# Patient Record
Sex: MALE | Race: OTHER | HISPANIC OR LATINO | Employment: FULL TIME | URBAN - METROPOLITAN AREA
[De-identification: names, ages, dates, MRNs, and addresses within clinical notes are randomized per-mention and may not be internally consistent; named-entity substitution may affect disease eponyms.]

---

## 2017-06-22 ENCOUNTER — HOSPITAL ENCOUNTER (EMERGENCY)
Facility: HOSPITAL | Age: 42
Discharge: HOME | End: 2017-06-22
Attending: EMERGENCY MEDICINE | Admitting: EMERGENCY MEDICINE

## 2017-06-22 VITALS
SYSTOLIC BLOOD PRESSURE: 159 MMHG | HEART RATE: 76 BPM | WEIGHT: 207.45 LBS | TEMPERATURE: 97.5 F | RESPIRATION RATE: 18 BRPM | OXYGEN SATURATION: 95 % | DIASTOLIC BLOOD PRESSURE: 102 MMHG

## 2017-06-22 DIAGNOSIS — S05.02XA LEFT CORNEAL ABRASION, INITIAL ENCOUNTER: Primary | ICD-10-CM

## 2017-06-22 PROCEDURE — 90471 IMMUNIZATION ADMIN: CPT

## 2017-06-22 PROCEDURE — 90715 TDAP VACCINE 7 YRS/> IM: CPT | Performed by: EMERGENCY MEDICINE

## 2017-06-22 PROCEDURE — 99283 EMERGENCY DEPT VISIT LOW MDM: CPT

## 2017-06-22 RX ORDER — TOBRAMYCIN 3 MG/ML
1 SOLUTION/ DROPS OPHTHALMIC ONCE
Status: COMPLETED | OUTPATIENT
Start: 2017-06-22 | End: 2017-06-22

## 2017-06-22 RX ORDER — KETOROLAC TROMETHAMINE 5 MG/ML
1 SOLUTION OPHTHALMIC ONCE
Status: COMPLETED | OUTPATIENT
Start: 2017-06-22 | End: 2017-06-22

## 2017-06-22 RX ORDER — PROPARACAINE HYDROCHLORIDE 5 MG/ML
2 SOLUTION/ DROPS OPHTHALMIC ONCE
Status: COMPLETED | OUTPATIENT
Start: 2017-06-22 | End: 2017-06-22

## 2017-06-22 RX ADMIN — KETOROLAC TROMETHAMINE 1 DROP: 5 SOLUTION OPHTHALMIC at 06:50

## 2017-06-22 RX ADMIN — TETANUS TOXOID, REDUCED DIPHTHERIA TOXOID AND ACELLULAR PERTUSSIS VACCINE, ADSORBED 0.5 ML: 5; 2.5; 8; 8; 2.5 SUSPENSION INTRAMUSCULAR at 06:53

## 2017-06-22 RX ADMIN — TOBRAMYCIN 1 DROP: 3 SOLUTION OPHTHALMIC at 06:52

## 2017-06-22 RX ADMIN — PROPARACAINE HYDROCHLORIDE 2 DROP: 5 SOLUTION/ DROPS OPHTHALMIC at 06:14

## 2017-06-22 RX ADMIN — FLUORESCEIN SODIUM 1 STRIP: 1 STRIP OPHTHALMIC at 06:14

## 2022-11-14 ENCOUNTER — APPOINTMENT (EMERGENCY)
Dept: CT IMAGING | Facility: HOSPITAL | Age: 47
End: 2022-11-14

## 2022-11-14 ENCOUNTER — APPOINTMENT (EMERGENCY)
Dept: RADIOLOGY | Facility: HOSPITAL | Age: 47
End: 2022-11-14

## 2022-11-14 ENCOUNTER — HOSPITAL ENCOUNTER (INPATIENT)
Facility: HOSPITAL | Age: 47
LOS: 1 days | Discharge: HOME | End: 2022-11-16
Attending: EMERGENCY MEDICINE | Admitting: STUDENT IN AN ORGANIZED HEALTH CARE EDUCATION/TRAINING PROGRAM

## 2022-11-14 ENCOUNTER — APPOINTMENT (OUTPATIENT)
Dept: CT IMAGING | Facility: HOSPITAL | Age: 47
End: 2022-11-14

## 2022-11-14 DIAGNOSIS — R07.9 CHEST PAIN, UNSPECIFIED TYPE: ICD-10-CM

## 2022-11-14 DIAGNOSIS — T78.40XA ALLERGIC REACTION, INITIAL ENCOUNTER: ICD-10-CM

## 2022-11-14 DIAGNOSIS — R13.10 ODYNOPHAGIA: ICD-10-CM

## 2022-11-14 DIAGNOSIS — R73.9 HYPERGLYCEMIA: ICD-10-CM

## 2022-11-14 DIAGNOSIS — R13.10 DYSPHAGIA, UNSPECIFIED TYPE: ICD-10-CM

## 2022-11-14 DIAGNOSIS — T18.9XXA FOREIGN BODY ALIMENTARY TRACT: ICD-10-CM

## 2022-11-14 DIAGNOSIS — R00.0 SINUS TACHYCARDIA: ICD-10-CM

## 2022-11-14 DIAGNOSIS — R07.9 CHEST PAIN IN ADULT: Primary | ICD-10-CM

## 2022-11-14 LAB
2HR DELTA HS TROPONIN: 15 NG/L
4HR DELTA HS TROPONIN: 25 NG/L
ALBUMIN SERPL BCP-MCNC: 3.8 G/DL (ref 3.5–5)
ALP SERPL-CCNC: 84 U/L (ref 34–104)
ALT SERPL W P-5'-P-CCNC: 28 U/L (ref 7–52)
AMPHETAMINES SERPL QL SCN: NEGATIVE
ANION GAP SERPL CALCULATED.3IONS-SCNC: 12 MMOL/L (ref 4–13)
AST SERPL W P-5'-P-CCNC: 18 U/L (ref 13–39)
BARBITURATES UR QL: NEGATIVE
BASOPHILS # BLD AUTO: 0.01 THOUSANDS/ÂΜL (ref 0–0.1)
BASOPHILS NFR BLD AUTO: 0 % (ref 0–1)
BENZODIAZ UR QL: NEGATIVE
BILIRUB SERPL-MCNC: 0.53 MG/DL (ref 0.2–1)
BNP SERPL-MCNC: 6 PG/ML (ref 0–100)
BUN SERPL-MCNC: 16 MG/DL (ref 5–25)
CALCIUM SERPL-MCNC: 9.4 MG/DL (ref 8.4–10.2)
CARDIAC TROPONIN I PNL SERPL HS: 24 NG/L
CARDIAC TROPONIN I PNL SERPL HS: 34 NG/L
CARDIAC TROPONIN I PNL SERPL HS: 9 NG/L
CHLORIDE SERPL-SCNC: 101 MMOL/L (ref 96–108)
CHOLEST SERPL-MCNC: 130 MG/DL
CO2 SERPL-SCNC: 23 MMOL/L (ref 21–32)
COCAINE UR QL: POSITIVE
CREAT SERPL-MCNC: 0.94 MG/DL (ref 0.6–1.3)
D DIMER PPP FEU-MCNC: 2.35 UG/ML FEU
EOSINOPHIL # BLD AUTO: 0.12 THOUSAND/ÂΜL (ref 0–0.61)
EOSINOPHIL NFR BLD AUTO: 2 % (ref 0–6)
ERYTHROCYTE [DISTWIDTH] IN BLOOD BY AUTOMATED COUNT: 13.4 % (ref 11.6–15.1)
EST. AVERAGE GLUCOSE BLD GHB EST-MCNC: 126 MG/DL
ETHANOL SERPL-MCNC: <10 MG/DL
GFR SERPL CREATININE-BSD FRML MDRD: 97 ML/MIN/1.73SQ M
GLUCOSE SERPL-MCNC: 230 MG/DL (ref 65–140)
HBA1C MFR BLD: 6 %
HCT VFR BLD AUTO: 41.9 % (ref 36.5–49.3)
HDLC SERPL-MCNC: 33 MG/DL
HGB BLD-MCNC: 14.4 G/DL (ref 12–17)
IMM GRANULOCYTES # BLD AUTO: 0.08 THOUSAND/UL (ref 0–0.2)
IMM GRANULOCYTES NFR BLD AUTO: 1 % (ref 0–2)
LDLC SERPL CALC-MCNC: 68 MG/DL (ref 0–100)
LIPASE SERPL-CCNC: 33 U/L (ref 11–82)
LYMPHOCYTES # BLD AUTO: 2.48 THOUSANDS/ÂΜL (ref 0.6–4.47)
LYMPHOCYTES NFR BLD AUTO: 33 % (ref 14–44)
MAGNESIUM SERPL-MCNC: 1.9 MG/DL (ref 1.9–2.7)
MCH RBC QN AUTO: 29.4 PG (ref 26.8–34.3)
MCHC RBC AUTO-ENTMCNC: 34.4 G/DL (ref 31.4–37.4)
MCV RBC AUTO: 86 FL (ref 82–98)
METHADONE UR QL: NEGATIVE
MONOCYTES # BLD AUTO: 0.15 THOUSAND/ÂΜL (ref 0.17–1.22)
MONOCYTES NFR BLD AUTO: 2 % (ref 4–12)
NEUTROPHILS # BLD AUTO: 4.7 THOUSANDS/ÂΜL (ref 1.85–7.62)
NEUTS SEG NFR BLD AUTO: 62 % (ref 43–75)
NONHDLC SERPL-MCNC: 97 MG/DL
NRBC BLD AUTO-RTO: 0 /100 WBCS
OPIATES UR QL SCN: NEGATIVE
OXYCODONE+OXYMORPHONE UR QL SCN: NEGATIVE
PCP UR QL: NEGATIVE
PLATELET # BLD AUTO: 341 THOUSANDS/UL (ref 149–390)
PMV BLD AUTO: 9.4 FL (ref 8.9–12.7)
POTASSIUM SERPL-SCNC: 3.6 MMOL/L (ref 3.5–5.3)
PROT SERPL-MCNC: 6.9 G/DL (ref 6.4–8.4)
RBC # BLD AUTO: 4.9 MILLION/UL (ref 3.88–5.62)
SODIUM SERPL-SCNC: 136 MMOL/L (ref 135–147)
T4 FREE SERPL-MCNC: 1.08 NG/DL (ref 0.76–1.46)
THC UR QL: NEGATIVE
TRIGL SERPL-MCNC: 146 MG/DL
TSH SERPL DL<=0.05 MIU/L-ACNC: 6.71 UIU/ML (ref 0.45–4.5)
WBC # BLD AUTO: 7.54 THOUSAND/UL (ref 4.31–10.16)

## 2022-11-14 RX ORDER — ASPIRIN 81 MG/1
162 TABLET, CHEWABLE ORAL ONCE
Status: COMPLETED | OUTPATIENT
Start: 2022-11-14 | End: 2022-11-14

## 2022-11-14 RX ORDER — SODIUM CHLORIDE, SODIUM LACTATE, POTASSIUM CHLORIDE, CALCIUM CHLORIDE 600; 310; 30; 20 MG/100ML; MG/100ML; MG/100ML; MG/100ML
100 INJECTION, SOLUTION INTRAVENOUS CONTINUOUS
Status: DISCONTINUED | OUTPATIENT
Start: 2022-11-14 | End: 2022-11-15

## 2022-11-14 RX ORDER — MAGNESIUM HYDROXIDE/ALUMINUM HYDROXICE/SIMETHICONE 120; 1200; 1200 MG/30ML; MG/30ML; MG/30ML
30 SUSPENSION ORAL ONCE
Status: COMPLETED | OUTPATIENT
Start: 2022-11-14 | End: 2022-11-14

## 2022-11-14 RX ORDER — MAGNESIUM HYDROXIDE/ALUMINUM HYDROXICE/SIMETHICONE 120; 1200; 1200 MG/30ML; MG/30ML; MG/30ML
30 SUSPENSION ORAL EVERY 4 HOURS PRN
Status: DISCONTINUED | OUTPATIENT
Start: 2022-11-14 | End: 2022-11-16 | Stop reason: HOSPADM

## 2022-11-14 RX ORDER — FAMOTIDINE 20 MG/1
20 TABLET, FILM COATED ORAL 2 TIMES DAILY
Status: DISCONTINUED | OUTPATIENT
Start: 2022-11-14 | End: 2022-11-14

## 2022-11-14 RX ORDER — LORAZEPAM 2 MG/ML
1 INJECTION INTRAMUSCULAR ONCE
Status: COMPLETED | OUTPATIENT
Start: 2022-11-14 | End: 2022-11-14

## 2022-11-14 RX ORDER — NICOTINE 21 MG/24HR
1 PATCH, TRANSDERMAL 24 HOURS TRANSDERMAL DAILY
Status: DISCONTINUED | OUTPATIENT
Start: 2022-11-14 | End: 2022-11-16 | Stop reason: HOSPADM

## 2022-11-14 RX ORDER — METHYLPREDNISOLONE SOD SUCC 125 MG
1 VIAL (EA) INJECTION ONCE
Status: COMPLETED | OUTPATIENT
Start: 2022-11-14 | End: 2022-11-14

## 2022-11-14 RX ORDER — EPINEPHRINE 1 MG/ML
1 INJECTION, SOLUTION, CONCENTRATE INTRAVENOUS ONCE
Status: COMPLETED | OUTPATIENT
Start: 2022-11-14 | End: 2022-11-14

## 2022-11-14 RX ORDER — ACETAMINOPHEN 325 MG/1
650 TABLET ORAL EVERY 6 HOURS PRN
Status: DISCONTINUED | OUTPATIENT
Start: 2022-11-14 | End: 2022-11-16 | Stop reason: HOSPADM

## 2022-11-14 RX ORDER — PANTOPRAZOLE SODIUM 40 MG/10ML
40 INJECTION, POWDER, LYOPHILIZED, FOR SOLUTION INTRAVENOUS
Status: DISCONTINUED | OUTPATIENT
Start: 2022-11-14 | End: 2022-11-16 | Stop reason: HOSPADM

## 2022-11-14 RX ADMIN — ALUMINUM HYDROXIDE, MAGNESIUM HYDROXIDE, AND DIMETHICONE 30 ML: 200; 20; 200 SUSPENSION ORAL at 07:24

## 2022-11-14 RX ADMIN — FAMOTIDINE 20 MG: 20 TABLET ORAL at 17:26

## 2022-11-14 RX ADMIN — SODIUM CHLORIDE, SODIUM LACTATE, POTASSIUM CHLORIDE, AND CALCIUM CHLORIDE 100 ML/HR: .6; .31; .03; .02 INJECTION, SOLUTION INTRAVENOUS at 07:23

## 2022-11-14 RX ADMIN — ASPIRIN 162 MG: 81 TABLET, CHEWABLE ORAL at 05:53

## 2022-11-14 RX ADMIN — IOHEXOL 100 ML: 350 INJECTION, SOLUTION INTRAVENOUS at 05:11

## 2022-11-14 RX ADMIN — FAMOTIDINE 20 MG: 20 TABLET ORAL at 10:29

## 2022-11-14 RX ADMIN — SODIUM CHLORIDE 1000 ML: 0.9 INJECTION, SOLUTION INTRAVENOUS at 02:52

## 2022-11-14 RX ADMIN — LORAZEPAM 1 MG: 2 INJECTION INTRAMUSCULAR; INTRAVENOUS at 03:01

## 2022-11-14 RX ADMIN — PANTOPRAZOLE SODIUM 40 MG: 40 INJECTION, POWDER, FOR SOLUTION INTRAVENOUS at 17:26

## 2022-11-14 NOTE — ASSESSMENT & PLAN NOTE
Presents with complaints of substernal chest pain, epigastric discomfort and SOB after eating at [facility] prior to falling asleep  SOB resolved and reports faint chest pain currently  · Likely noncardiac, give GI cocktail   Consider RUQ to evalute GB if symptoms persist   · No prior cardiac hx  · JORGE 0  · CXR: NAD  · CTA negative for PE   · UDS pending  · Trend hs trop and ECG  · Hs trop: 9>24  · ECG: sinus tachycardia 114, nonspecific ST-T wave abnormality   · Monitor on tele   · Check A1c and lipid panel

## 2022-11-14 NOTE — H&P
[address] [place] [address] 1975, 41 y o  male MRN: [id number]  Unit/Bed#: -01 Encounter: [id number]  Primary Care Provider: No primary care provider on file  Date and time admitted to hospital: 11/14/2022  2:29 AM    * Chest pain  Assessment & Plan  Presents with complaints of substernal chest pain, epigastric discomfort and SOB after eating at [facility] prior to falling asleep  SOB resolved and reports faint chest pain currently  · Likely noncardiac, give GI cocktail  Consider RUQ to evalute GB if symptoms persist   · No prior cardiac hx  · JORGE 0  · CXR: NAD  · CTA negative for PE   · UDS pending  · Trend hs trop and ECG  · Hs trop: 9>24  · ECG: sinus tachycardia 114, nonspecific ST-T wave abnormality   · Monitor on tele   · Check A1c and lipid panel       Allergic reaction  Assessment & Plan  · Reports pruritic rash  No angioedema, no difficulty breathing  SOB resolved  · Faint maculopapular rash on trunk   · S/p Benadryl, Epi and Solu-medrol in EMS  · Supportive care     Sinus tachycardia  Assessment & Plan  · Possibly related to epinephrine use  · CTA negative for PE   · Check TSH   · Monitor on tele   · Improved with bolus, continue IVF    VTE Pharmacologic Prophylaxis: VTE Score: 2 Low Risk (Score 0-2) - Encourage Ambulation  Code Status: Level 1 - Full Code   Discussion with family: Patient declined call to   Anticipated Length of Stay: Patient will be admitted on an observation basis with an anticipated length of stay of less than 2 midnights secondary to chest pain r/o  Total Time for Visit, including Counseling / Coordination of Care: 45 minutes Greater than 50% of this total time spent on direct patient counseling and coordination of care      Chief Complaint: chest pain    History of Present Illness:  Cody Pritchett is a 41 y o  male  who presents with concerns of chest pain, shortness of breath and possible allergic reaction starting this evening  Reports substernal chest pain and epigastric pain starting tonight after eating [employer] prior to falling asleep  Noted some shortness of breath, now resolved  No association with positions, respirations  He denies any prior cardiac history or cardiac testing  Does not any medical problems to his knowledge  Does not follow with a primary care doctor  He reports an itchy rash on his trunk  On evaluation, faint maculopapular rash visualized  Was placed on 2 L nasal cannula in ER for comfort, not noted to desaturate less than 90%  Patient Nigerian-speaking and video interpretation was used  During history, patient became very agitated and would refuse to answer questions  Attempted to explain to patient trying to help him to figure out what is going on  Continue to use foul language and not answer when  ask questions  Review of Systems:  Review of Systems   Constitutional: Negative for chills and fever  Respiratory: Positive for shortness of breath  Cardiovascular: Positive for chest pain  Negative for palpitations and leg swelling  Gastrointestinal: Positive for abdominal pain  Negative for diarrhea, nausea and vomiting  Endocrine: Negative for polyuria  Genitourinary: Negative for difficulty urinating, frequency and urgency  Musculoskeletal: Negative for gait problem  Skin: Positive for rash  Neurological: Negative for dizziness, syncope and weakness  Psychiatric/Behavioral: Negative for sleep disturbance  Past Medical and Surgical History:   History reviewed  No pertinent past medical history  History reviewed  No pertinent surgical history  Meds/Allergies:  Prior to Admission medications    Not on File     I have reviewed home medications with patient personally  Allergies:    Allergies   Allergen Reactions   • Shellfish-Derived Products - Food Allergy Rash       Social History:  Marital Status: Single   Occupation:  Patient Pre-hospital Living Situation: Home  Patient Pre-hospital Level of Mobility: walks  Patient Pre-hospital Diet Restrictions:   Substance Use History:   Social History     Substance and Sexual Activity   Alcohol Use Not Currently    Comment: socially     Social History     Tobacco Use   Smoking Status Current Every Day Smoker   • Packs/day: 1 00   • Years: 23 00   • Pack years: 23 00   • Types: Cigarettes   Smokeless Tobacco Never Used     Social History     Substance and Sexual Activity   Drug Use No       Family History:  History reviewed  No pertinent family history  Physical Exam:     Vitals:   Blood Pressure: 113/73 (11/14/22 0552)  Pulse: 96 (11/14/22 0552)  Temperature: 97 7 °F (36 5 °C) (11/14/22 0242)  Temp Source: Oral (11/14/22 0242)  Respirations: 16 (11/14/22 0552)  Weight - Scale: 93 9 kg (207 lb) (11/14/22 0232)  SpO2: 98 % (11/14/22 0552)    Physical Exam  Vitals and nursing note reviewed  Constitutional:       General: He is not in acute distress  Appearance: He is not toxic-appearing  Comments: Patient is resting comfortably during exam  Not in acute distress   HENT:      Head: Normocephalic and atraumatic  Mouth/Throat:      Mouth: Mucous membranes are moist    Eyes:      Extraocular Movements: Extraocular movements intact  Pupils: Pupils are equal, round, and reactive to light  Cardiovascular:      Rate and Rhythm: Normal rate and regular rhythm  Pulses: Normal pulses  Heart sounds: Normal heart sounds  No murmur heard  Pulmonary:      Effort: Pulmonary effort is normal  No respiratory distress  Breath sounds: Normal breath sounds  No wheezing or rales  Abdominal:      General: Abdomen is flat  Bowel sounds are normal  There is no distension  Palpations: Abdomen is soft  Tenderness: There is abdominal tenderness  There is no guarding        Comments: Tenderness evaluation epigastric and right upper quadrant  Faint maculopapular rash visualized on trunk Musculoskeletal:      Right lower leg: No edema  Left lower leg: No edema  Skin:     General: Skin is warm and dry  Capillary Refill: Capillary refill takes less than 2 seconds  Neurological:      General: No focal deficit present  Mental Status: He is alert and oriented to person, place, and time  Cranial Nerves: No cranial nerve deficit  Psychiatric:         Mood and Affect: Mood normal  Affect is angry  Behavior: Behavior is uncooperative and agitated  Additional Data:   Lab Results:  Results from last 7 days   Lab Units 11/14/22  0254   WBC Thousand/uL 7 54   HEMOGLOBIN g/dL 14 4   HEMATOCRIT % 41 9   PLATELETS Thousands/uL 341   NEUTROS PCT % 62   LYMPHS PCT % 33   MONOS PCT % 2*   EOS PCT % 2     Results from last 7 days   Lab Units 11/14/22  0254   SODIUM mmol/L 136   POTASSIUM mmol/L 3 6   CHLORIDE mmol/L 101   CO2 mmol/L 23   BUN mg/dL 16   CREATININE mg/dL 0 94   ANION GAP mmol/L 12   CALCIUM mg/dL 9 4   ALBUMIN g/dL 3 8   TOTAL BILIRUBIN mg/dL 0 53   ALK PHOS U/L 84   ALT U/L 28   AST U/L 18   GLUCOSE RANDOM mg/dL 230*                       Imaging: Reviewed radiology reports from this admission including: chest xray and chest CT scan  CTA ED chest PE Study   Final Result by Antonio Robert MD (11/14 0517)      No acute pathology  No pulmonary embolus  Workstation performed: [id number]         XR chest 1 view portable   ED Interpretation by Leo Daniel MD (11/14 0315)   No infiltrate or pneumothorax      Final Result by Antonio Robert MD (11/14 0517)      No acute cardiopulmonary disease  Workstation performed: [id number]             EKG and Other Studies Reviewed on Admission:   · EKG: Sinus Tachycardia       ** Please Note: This note has been constructed using a voice recognition system   **

## 2022-11-14 NOTE — ASSESSMENT & PLAN NOTE
· Possibly related to epinephrine use  · CTA negative for PE   · Check TSH   · Monitor on tele   · Improved with bolus, continue IVF

## 2022-11-14 NOTE — ED PROVIDER NOTES
History  Chief Complaint   Patient presents with   • Allergic Reaction     Patient arrives via EMS with reports of allergic reaction after eating at [facility] with rash, difficulty breathing, and chest pain  EMS gave pt epi, solumedrol, and benadryl PTA     Patient is a 22-year-old male seen in the emergency department with concern for substernal strong chest pain which began this evening prior to evaluation  Patient states that he ate food from [facility] this evening and developed chest pain prior to evaluation  Patient also notes some scattered rash over his body  Patient was treated with epinephrine, Solu-Medrol, and Benadryl prior to evaluation for symptom control  Patient notes history of allergy to seafood  Patient also notes some mild shortness of breath  Patient notes no abdominal pain, nausea, vomiting, weakness  Patient notes no definite clear exacerbating or alleviating factors for the pain  Patient denies having similar symptoms in the past   Patient denies alcohol and other drug use this evening  None       History reviewed  No pertinent past medical history  History reviewed  No pertinent surgical history  History reviewed  No pertinent family history  I have reviewed and agree with the history as documented  E-Cigarette/Vaping   • E-Cigarette Use Never User      E-Cigarette/Vaping Substances     Social History     Tobacco Use   • Smoking status: Current Every Day Smoker     Packs/day: 1 00     Years: 23 00     Pack years: 23 00     Types: Cigarettes   • Smokeless tobacco: Never Used   Vaping Use   • Vaping Use: Never used   Substance Use Topics   • Alcohol use: Not Currently     Comment: socially   • Drug use: No       Review of Systems   Constitutional: Negative for chills and fever  HENT: Negative for ear pain and sore throat  Eyes: Negative for pain and visual disturbance  Respiratory: Positive for shortness of breath  Negative for cough      Cardiovascular: Positive for chest pain  Negative for palpitations  Gastrointestinal: Negative for abdominal pain, nausea and vomiting  Genitourinary: Negative for decreased urine volume and difficulty urinating  Musculoskeletal: Negative for arthralgias and back pain  Skin: Positive for rash  Negative for wound  Neurological: Negative for seizures and syncope  Psychiatric/Behavioral: Negative for agitation and confusion  The patient is nervous/anxious  All other systems reviewed and are negative  Physical Exam  Physical Exam  Vitals and nursing note reviewed  Constitutional:       Appearance: He is well-developed  Comments: appears uncomfortable   HENT:      Head: Normocephalic and atraumatic  Right Ear: External ear normal       Left Ear: External ear normal       Nose: Nose normal       Mouth/Throat:      Pharynx: Oropharynx is clear  Eyes:      General: No scleral icterus  Conjunctiva/sclera: Conjunctivae normal    Cardiovascular:      Rate and Rhythm: Regular rhythm  Tachycardia present  Heart sounds: No murmur heard  Pulmonary:      Effort: Pulmonary effort is normal  No respiratory distress  Breath sounds: Normal breath sounds  Abdominal:      General: There is no distension  Palpations: Abdomen is soft  Tenderness: There is no abdominal tenderness  Musculoskeletal:         General: No deformity or signs of injury  Cervical back: Normal range of motion and neck supple  Skin:     General: Skin is warm and dry  Comments: faint erythematous rash noted over trunk   Neurological:      General: No focal deficit present  Mental Status: He is alert  Cranial Nerves: No cranial nerve deficit  Sensory: No sensory deficit  Psychiatric:         Behavior: Behavior normal          Thought Content:  Thought content normal       Comments: appears anxious         Vital Signs  ED Triage Vitals   Temperature Pulse Respirations Blood Pressure SpO2   11/14/22 0242 11/14/22 0232 11/14/22 0232 11/14/22 0232 11/14/22 0232   97 7 °F (36 5 °C) (!) 124 (!) 26 129/79 100 %      Temp Source Heart Rate Source Patient Position - Orthostatic VS BP Location FiO2 (%)   11/14/22 0242 11/14/22 0232 11/14/22 0232 11/14/22 0232 --   Oral Monitor Lying Right arm       Pain Score       --                  Vitals:    11/14/22 0242 11/14/22 0303 11/14/22 0352 11/14/22 0552   BP:  119/71 128/69 113/73   Pulse: (!) 114 (!) 112 (!) 108 96   Patient Position - Orthostatic VS:  Sitting Sitting Lying         Visual Acuity      ED Medications  Medications   aspirin chewable tablet 162 mg (has no administration in time range)   diphenhydrAMINE (FOR EMS ONLY) (BENADRYL) injection 50 mg (0 mg Does not apply Given to EMS 11/14/22 0234)   EPINEPHrine PF (FOR EMS ONLY) (ADRENALIN) 1 mg/mL injection 1 mg (0 mg Does not apply Given to EMS 11/14/22 0234)   methylPREDNISolone sodium succinate (FOR EMS ONLY) (Solu-MEDROL) 125 MG injection 125 mg (0 mg Does not apply Given to EMS 11/14/22 0234)   sodium chloride 0 9 % bolus 1,000 mL (0 mL Intravenous Stopped 11/14/22 0431)   LORazepam (ATIVAN) injection 1 mg (1 mg Intravenous Given 11/14/22 0301)   iohexol (OMNIPAQUE) 350 MG/ML injection (SINGLE-DOSE) 100 mL (100 mL Intravenous Given 11/14/22 0511)       Diagnostic Studies  Results Reviewed     Procedure Component Value Units Date/Time    HS Troponin I 2hr [[id number]]  (Normal) Collected: 11/14/22 0428    Lab Status: Final result Specimen: Blood from Arm, Left Updated: 11/14/22 0516     hs TnI 2hr 24 ng/L      Delta 2hr hsTnI 15 ng/L     D-dimer, quantitative [[id number]]  (Abnormal) Collected: 11/14/22 0254    Lab Status: Final result Specimen: Blood from Arm, Left Updated: 11/14/22 0429     D-Dimer, Quant 2 35 ug/ml FEU     HS Troponin I 4hr [[id number]]     Lab Status: No result Specimen: Blood     HS Troponin 0hr (reflex protocol) [[id number]]  (Normal) Collected: 11/14/22 0254    Lab Status: Final result Specimen: Blood from Arm, Left Updated: 11/14/22 0333     hs TnI 0hr 9 ng/L     B-Type Natriuretic Peptide(BNP), AN, CA, EA Campuses Only [[id number]]  (Normal) Collected: 11/14/22 0254    Lab Status: Final result Specimen: Blood from Arm, Left Updated: 11/14/22 0332     BNP 6 pg/mL     Comprehensive metabolic panel [[id number]]  (Abnormal) Collected: 11/14/22 0254    Lab Status: Final result Specimen: Blood from Arm, Left Updated: 11/14/22 0329     Sodium 136 mmol/L      Potassium 3 6 mmol/L      Chloride 101 mmol/L      CO2 23 mmol/L      ANION GAP 12 mmol/L      BUN 16 mg/dL      Creatinine 0 94 mg/dL      Glucose 230 mg/dL      Calcium 9 4 mg/dL      AST 18 U/L      ALT 28 U/L      Alkaline Phosphatase 84 U/L      Total Protein 6 9 g/dL      Albumin 3 8 g/dL      Total Bilirubin 0 53 mg/dL      eGFR 97 ml/min/1 73sq m     Narrative:      [place] guidelines for Chronic Kidney Disease (CKD):   •  Stage 1 with normal or high GFR (GFR > 90 mL/min/1 73 square meters)  •  Stage 2 Mild CKD (GFR = 60-89 mL/min/1 73 square meters)  •  Stage 3A Moderate CKD (GFR = 45-59 mL/min/1 73 square meters)  •  Stage 3B Moderate CKD (GFR = 30-44 mL/min/1 73 square meters)  •  Stage 4 Severe CKD (GFR = 15-29 mL/min/1 73 square meters)  •  Stage 5 End Stage CKD (GFR <15 mL/min/1 73 square meters)  Note: GFR calculation is accurate only with a steady state creatinine    Magnesium [[id number]]  (Normal) Collected: 11/14/22 0254    Lab Status: Final result Specimen: Blood from Arm, Left Updated: 11/14/22 0329     Magnesium 1 9 mg/dL     Ethanol [[id number]]  (Normal) Collected: 11/14/22 0254    Lab Status: Final result Specimen: Blood from Arm, Left Updated: 11/14/22 0329     Ethanol Lvl <10 mg/dL     CBC and differential [[id number]]  (Abnormal) Collected: 11/14/22 0254    Lab Status: Final result Specimen: Blood from Arm, Left Updated: 11/14/22 0315     WBC 7 54 Thousand/uL      RBC 4 90 Million/uL      Hemoglobin 14 4 g/dL      Hematocrit 41 9 %      MCV 86 fL      MCH 29 4 pg      MCHC 34 4 g/dL      RDW 13 4 %      MPV 9 4 fL      Platelets 403 Thousands/uL      nRBC 0 /100 WBCs      Neutrophils Relative 62 %      Immat GRANS % 1 %      Lymphocytes Relative 33 %      Monocytes Relative 2 %      Eosinophils Relative 2 %      Basophils Relative 0 %      Neutrophils Absolute 4 70 Thousands/µL      Immature Grans Absolute 0 08 Thousand/uL      Lymphocytes Absolute 2 48 Thousands/µL      Monocytes Absolute 0 15 Thousand/µL      Eosinophils Absolute 0 12 Thousand/µL      Basophils Absolute 0 01 Thousands/µL     Rapid drug screen, urine [[id number]]     Lab Status: No result Specimen: Urine                  CTA ED chest PE Study   Final Result by Lorrie Whelan MD (11/14 0517)      No acute pathology  No pulmonary embolus  Workstation performed: [id number]         XR chest 1 view portable   ED Interpretation by Myra Gentile MD (11/14 0315)   No infiltrate or pneumothorax      Final Result by Lorrie Whelan MD (11/14 0517)      No acute cardiopulmonary disease                    Workstation performed: [id number]                    Procedures  ECG 12 Lead Documentation Only    Date/Time: 11/14/2022 2:54 AM  Performed by: Myra Gentile MD  Authorized by: Myra Gentile MD     Indications / Diagnosis:  Chest pain  ECG reviewed by me, the ED Provider: yes    Patient location:  ED  Rate:     ECG rate:  114    ECG rate assessment: tachycardic    Rhythm:     Rhythm: sinus tachycardia    QRS:     QRS axis:  Normal  ST segments:     ST segments:  Non-specific  T waves:     T waves: non-specific    Comments:      Sinus tachycardia at 114, normal axis, , QRS 75, QTc 480, nonspecific ST-T wave abnormality, no definite evidence of acute ischemia             ED Course             HEART Risk Score    Flowsheet Row Most Recent Value   Heart Score Risk Calculator    History 1 Filed at: 11/14/2022 0542   ECG 1 Filed at: 11/14/2022 0542   Age 0 Filed at: 11/14/2022 0542   Risk Factors 0 Filed at: 11/14/2022 0542   Troponin 0 Filed at: 11/14/2022 0542   HEART Score 2 Filed at: 11/14/2022 0542                        SBIRT 20yo+    Flowsheet Row Most Recent Value   SBIRT (25 yo +)    In order to provide better care to our patients, we are screening all of our patients for alcohol and drug use  Would it be okay to ask you these screening questions? Unable to answer at this time Filed at: 11/14/2022 0335                    [facility]  Number of Diagnoses or Management Options  Allergic reaction, initial encounter  Chest pain in adult  Hyperglycemia  Diagnosis management comments: Patient is a 19-year-old male seen in the emergency department with concern for chest pain  EKG was obtained and noted  Chest x-ray showed no infiltrate or pneumothorax  Patient was treated with medication for symptom control, with good effect  Laboratory evaluation remarkable for elevated glucose of 230, elevated D-dimer of 2 35, serial troponins of 9, 24  CTA chest was obtained to evaluate for pulmonary embolism  CT imaging showed no evidence of pulmonary embolism  Plan to admit patient (observation status) for further evaluation treatment, with concern for chest pain, allergic reaction  Case was reviewed with medicine team   Plan of care was reviewed with patient         Amount and/or Complexity of Data Reviewed  Clinical lab tests: ordered and reviewed  Tests in the radiology section of CPT®: ordered and reviewed  Tests in the medicine section of CPT®: ordered and reviewed        Disposition  Final diagnoses:   Chest pain in adult   Hyperglycemia   Allergic reaction, initial encounter     Time reflects when diagnosis was documented in both MDM as applicable and the Disposition within this note     Time User Action Codes Description Comment    11/14/2022  2:36 AM Precious Vasquez Add [R07 9] Chest pain in adult     11/14/2022  3:30 AM Precious Vasquez Add [R73 9] Hyperglycemia 11/14/2022  5:38 AM Valiant Weller Add [T78 40XA] Allergic reaction, initial encounter     11/14/2022  5:38 AM Valiant Weller Modify [R07 9] Chest pain in adult     11/14/2022  5:38 AM Valiant Weller Modify [T78 40XA] Allergic reaction, initial encounter     11/14/2022  5:53 AM Valiant Weller Modify [R07 9] Chest pain in adult     11/14/2022  5:53 AM Valiant Weller Modify [T78 40XA] Allergic reaction, initial encounter       ED Disposition     ED Disposition   Admit    Condition   Stable    Date/Time   Mon Nov 14, 2022  5:55 AM    Comment   Case was reviewed with medicine team, and the patient's admission status was agreed to be Admission Status: observation status to the service of Dr Renan Almeida             Follow-up Information    None         Patient's Medications    No medications on file           PDMP Review     None          ED Provider  Electronically Signed by           Amanuel Daly MD  11/14/22 4714

## 2022-11-14 NOTE — ED NOTES
Patient noted to desat to 90% RA when sleeping  Patient placed on 2l NC with improvement to 95%   MD aware     Tay Edward RN  11/14/22 8105

## 2022-11-14 NOTE — PLAN OF CARE
Problem: PAIN - ADULT  Goal: Verbalizes/displays adequate comfort level or baseline comfort level  Description: Interventions:  - Encourage patient to monitor pain and request assistance  - Assess pain using appropriate pain scale  - Administer analgesics based on type and severity of pain and evaluate response  - Implement non-pharmacological measures as appropriate and evaluate response  - Consider cultural and social influences on pain and pain management  - Notify physician/advanced practitioner if interventions unsuccessful or patient reports new pain  Outcome: Progressing     Problem: INFECTION - ADULT  Goal: Absence or prevention of progression during hospitalization  Description: INTERVENTIONS:  - Assess and monitor for signs and symptoms of infection  - Monitor lab/diagnostic results  - Monitor all insertion sites, i e  indwelling lines, tubes, and drains  - Monitor endotracheal if appropriate and nasal secretions for changes in amount and color  - [place] appropriate cooling/warming therapies per order  - Administer medications as ordered  - Instruct and encourage patient and family to use good hand hygiene technique  - Identify and instruct in appropriate isolation precautions for identified infection/condition  Outcome: Progressing     Problem: SAFETY ADULT  Goal: Patient will remain free of falls  Description: INTERVENTIONS:  - Educate patient/family on patient safety including physical limitations  - Instruct patient to call for assistance with activity   - Consult OT/PT to assist with strengthening/mobility   - Keep Call bell within reach  - Keep bed low and locked with side rails adjusted as appropriate  - Keep care items and personal belongings within reach  - Initiate and maintain comfort rounds  - Make Fall Risk Sign visible to staff  - Offer Toileting every 2 Hours, in advance of need  - Initiate/Maintain alarm  - Obtain necessary fall risk management equipment:   - Apply yellow socks and bracelet for high fall risk patients  - Consider moving patient to room near nurses station  Outcome: Progressing  Goal: Maintain or return to baseline ADL function  Description: INTERVENTIONS:  -  Assess patient's ability to carry out ADLs; assess patient's baseline for ADL function and identify physical deficits which impact ability to perform ADLs (bathing, care of mouth/teeth, toileting, grooming, dressing, etc )  - Assess/evaluate cause of self-care deficits   - Assess range of motion  - Assess patient's mobility; develop plan if impaired  - Assess patient's need for assistive devices and provide as appropriate  - Encourage maximum independence but intervene and supervise when necessary  - Involve family in performance of ADLs  - Assess for home care needs following discharge   - Consider OT consult to assist with ADL evaluation and planning for discharge  - Provide patient education as appropriate  Outcome: Progressing  Goal: Maintains/Returns to pre admission functional level  Description: INTERVENTIONS:  - Perform BMAT or MOVE assessment daily    - Set and communicate daily mobility goal to care team and patient/family/caregiver  - Collaborate with rehabilitation services on mobility goals if consulted  - Perform Range of Motion 2 times a day  - Reposition patient every 2 hours    - Dangle patient 2 times a day  - Stand patient 2 times a day  - Ambulate patient 2 times a day  - Out of bed to chair 2 times a day   - Out of bed for meals 2 times a day  - Out of bed for toileting  - Record patient progress and toleration of activity level   Outcome: Progressing     Problem: DISCHARGE PLANNING  Goal: Discharge to home or other facility with appropriate resources  Description: INTERVENTIONS:  - Identify barriers to discharge w/patient and caregiver  - Arrange for needed discharge resources and transportation as appropriate  - Identify discharge learning needs (meds, wound care, etc )  - Arrange for interpretive services to assist at discharge as needed  - Refer to Case Management Department for coordinating discharge planning if the patient needs post-hospital services based on physician/advanced practitioner order or complex needs related to functional status, cognitive ability, or social support system  Outcome: Progressing     Problem: Knowledge Deficit  Goal: Patient/family/caregiver demonstrates understanding of disease process, treatment plan, medications, and discharge instructions  Description: Complete learning assessment and assess knowledge base    Interventions:  - Provide teaching at level of understanding  - Provide teaching via preferred learning methods  Outcome: Progressing

## 2022-11-14 NOTE — SPEECH THERAPY NOTE
Speech-Language Pathology Bedside Swallow Evaluation      Patient Name: Emily Maurer    QOJWG'T Date: 11/14/2022     Problem List  Principal Problem:    Chest pain  Active Problems:    Sinus tachycardia    Allergic reaction      Past Medical History  History reviewed  No pertinent past medical history  Past Surgical History  History reviewed  No pertinent surgical history  Summary   Pt took only very small amounts of materials (refused more than 1 bite of soft cookie and declined dense solid food (apple) but took individual sips of liquid with no s/s aspiration and he denied s/s with yogurt  Recommendation:  Consider additional evaluation (Consider GI evaluation, ? Esophagram vs other ie EGD)  Diet: consider full liquids and advance as medically and clinically indicated   Recommended Form of Meds: as tolerated (crush if any c/o pill dysphagia)  Reflux precautions? Other Recommendations: Continue oral care        Current Medical Status  Emily Maurer is a 41 y o  male  who presents with concerns of chest pain, shortness of breath and possible allergic reaction starting this evening  Reports substernal chest pain and epigastric pain starting tonight after eating [employer] prior to falling asleep  He reports an itchy rash on his trunk  On evaluation, faint maculopapular rash visualized  Was placed on 2 L nasal cannula in ER for comfort, not noted to desaturate less than 90%  DX:  Chest pain, allergic reaction, sinus tachycardia (?related to epinephrine use)  C/O difficulty swallowing food>liquid dysphagia  SLP Swallowing Evaluation ordered at this time  Allergies:  Fish allergies    Past medical history:  Please see H&P for details    Special Studies:  CT of soft tissue of neck:    1  Exam is compromised by lack of IV contrast   No definite acute findings within the neck  Patent visualized airways  2   No radiopaque foreign body in the visualized aerodigestive tract      3   Total opacification of the right maxillary sinus with antral expansion and extension into right ethmoidal air cells  Underlying polypoid disease is not excluded  Recommend follow-up with ENT service  CTA Chest PE study:  No acute pathology  No pulmonary embolus  Swallow Information   Current Symptoms/Concerns: retrosternal pressure s/p ingestion of solid food   Current Diet: NPO  Baseline Diet: regular diet and thin liquids      Baseline Assessment   Behavior/Cognition: alert  Speech/Language Status: able to participate in conversation with interpretor   Patient Positioning: upright in bed  Pain Status/Interventions/Response to Interventions: C/O substernal chest discomfort     Swallow Mechanism Exam  Facial: symmetrical  Labial: WFL  Lingual: WFL  Velum: symmetrical  Mandible: adequate ROM  Dentition: partial dentition  Vocal quality:clear to begin, "strained" s/p cough  Cough: strong   Respiratory Status: on RA       Consistencies Assessed and Performance   Consistencies Administered: thin liquids, puree and 1 bite of soft cookie    Oral Stage:   Mastication  bolus formation and transfer appeared to be functional with no significant oral residue noted  No overt s/s reduced oral control  Pharyngeal Stage: WFL suspected  Swallow Mechanics:  Swallowing initiation appeared prompt  Laryngeal rise was palpated and judged to be within functional limits  No immediate coughing with the minimal food or single sips of water  C/O discomfort and presented with mildly delayed cough s/p cookie        Esophageal Concerns: c/o s/s "acido" (reflujo acido), pain/discomfort with swallowing    Summary and Recommendations (see above)    Results Reviewed with: patient, RN and MD     Treatment Recommended: I suspect that direct SLP tx will not be necessary but will f/u re: results of additional evaluation

## 2022-11-14 NOTE — ASSESSMENT & PLAN NOTE
· Reports pruritic rash  No angioedema, no difficulty breathing  SOB resolved     · Faint maculopapular rash on trunk   · S/p Benadryl, Epi and Solu-medrol in EMS  · Supportive care

## 2022-11-15 ENCOUNTER — APPOINTMENT (OUTPATIENT)
Dept: NON INVASIVE DIAGNOSTICS | Facility: HOSPITAL | Age: 47
End: 2022-11-15

## 2022-11-15 ENCOUNTER — ANESTHESIA EVENT (INPATIENT)
Dept: GASTROENTEROLOGY | Facility: HOSPITAL | Age: 47
End: 2022-11-15

## 2022-11-15 ENCOUNTER — APPOINTMENT (OUTPATIENT)
Dept: GASTROENTEROLOGY | Facility: HOSPITAL | Age: 47
End: 2022-11-15

## 2022-11-15 ENCOUNTER — APPOINTMENT (INPATIENT)
Dept: NON INVASIVE DIAGNOSTICS | Facility: HOSPITAL | Age: 47
End: 2022-11-15

## 2022-11-15 ENCOUNTER — ANESTHESIA (INPATIENT)
Dept: GASTROENTEROLOGY | Facility: HOSPITAL | Age: 47
End: 2022-11-15

## 2022-11-15 ENCOUNTER — APPOINTMENT (OUTPATIENT)
Dept: CT IMAGING | Facility: HOSPITAL | Age: 47
End: 2022-11-15

## 2022-11-15 PROBLEM — R82.90 ABNORMAL RESULT ON SCREENING URINE TEST: Status: ACTIVE | Noted: 2022-11-15

## 2022-11-15 PROBLEM — R13.10 DYSPHAGIA: Status: ACTIVE | Noted: 2022-11-15

## 2022-11-15 LAB
2HR DELTA HS TROPONIN: 1 NG/L
ANION GAP SERPL CALCULATED.3IONS-SCNC: 7 MMOL/L (ref 4–13)
ATRIAL RATE: 114 BPM
ATRIAL RATE: 69 BPM
ATRIAL RATE: 92 BPM
ATRIAL RATE: 94 BPM
BUN SERPL-MCNC: 13 MG/DL (ref 5–25)
CALCIUM SERPL-MCNC: 9.4 MG/DL (ref 8.4–10.2)
CARDIAC TROPONIN I PNL SERPL HS: 4 NG/L
CARDIAC TROPONIN I PNL SERPL HS: 5 NG/L
CHLORIDE SERPL-SCNC: 103 MMOL/L (ref 96–108)
CO2 SERPL-SCNC: 27 MMOL/L (ref 21–32)
CREAT SERPL-MCNC: 0.62 MG/DL (ref 0.6–1.3)
ERYTHROCYTE [DISTWIDTH] IN BLOOD BY AUTOMATED COUNT: 13.4 % (ref 11.6–15.1)
GFR SERPL CREATININE-BSD FRML MDRD: 119 ML/MIN/1.73SQ M
GLUCOSE SERPL-MCNC: 119 MG/DL (ref 65–140)
GLUCOSE SERPL-MCNC: 121 MG/DL (ref 65–140)
GLUCOSE SERPL-MCNC: 123 MG/DL (ref 65–140)
GLUCOSE SERPL-MCNC: 154 MG/DL (ref 65–140)
HCT VFR BLD AUTO: 39 % (ref 36.5–49.3)
HGB BLD-MCNC: 13.3 G/DL (ref 12–17)
MCH RBC QN AUTO: 29.2 PG (ref 26.8–34.3)
MCHC RBC AUTO-ENTMCNC: 34.1 G/DL (ref 31.4–37.4)
MCV RBC AUTO: 86 FL (ref 82–98)
P AXIS: 56 DEGREES
P AXIS: 57 DEGREES
P AXIS: 57 DEGREES
P AXIS: 62 DEGREES
P AXIS: 65 DEGREES
P AXIS: 73 DEGREES
PLATELET # BLD AUTO: 278 THOUSANDS/UL (ref 149–390)
PMV BLD AUTO: 9.8 FL (ref 8.9–12.7)
POTASSIUM SERPL-SCNC: 3.9 MMOL/L (ref 3.5–5.3)
PR INTERVAL: 151 MS
PR INTERVAL: 164 MS
PR INTERVAL: 166 MS
PR INTERVAL: 176 MS
PR INTERVAL: 176 MS
PR INTERVAL: 178 MS
QRS AXIS: 16 DEGREES
QRS AXIS: 18 DEGREES
QRS AXIS: 19 DEGREES
QRS AXIS: 19 DEGREES
QRS AXIS: 26 DEGREES
QRS AXIS: 32 DEGREES
QRSD INTERVAL: 72 MS
QRSD INTERVAL: 74 MS
QRSD INTERVAL: 75 MS
QRSD INTERVAL: 78 MS
QRSD INTERVAL: 78 MS
QRSD INTERVAL: 82 MS
QT INTERVAL: 320 MS
QT INTERVAL: 354 MS
QT INTERVAL: 355 MS
QT INTERVAL: 355 MS
QT INTERVAL: 360 MS
QT INTERVAL: 382 MS
QTC INTERVAL: 409 MS
QTC INTERVAL: 441 MS
QTC INTERVAL: 442 MS
QTC INTERVAL: 444 MS
QTC INTERVAL: 444 MS
QTC INTERVAL: 445 MS
RBC # BLD AUTO: 4.55 MILLION/UL (ref 3.88–5.62)
SODIUM SERPL-SCNC: 137 MMOL/L (ref 135–147)
T WAVE AXIS: 11 DEGREES
T WAVE AXIS: 29 DEGREES
T WAVE AXIS: 36 DEGREES
T WAVE AXIS: 8 DEGREES
T WAVE AXIS: 9 DEGREES
T WAVE AXIS: 9 DEGREES
T4 FREE SERPL-MCNC: 0.91 NG/DL (ref 0.76–1.46)
TSH SERPL DL<=0.05 MIU/L-ACNC: 0.41 UIU/ML (ref 0.45–4.5)
VENTRICULAR RATE: 114 BPM
VENTRICULAR RATE: 69 BPM
VENTRICULAR RATE: 92 BPM
VENTRICULAR RATE: 94 BPM
WBC # BLD AUTO: 12.51 THOUSAND/UL (ref 4.31–10.16)

## 2022-11-15 PROCEDURE — 0DB68ZX EXCISION OF STOMACH, VIA NATURAL OR ARTIFICIAL OPENING ENDOSCOPIC, DIAGNOSTIC: ICD-10-PCS | Performed by: INTERNAL MEDICINE

## 2022-11-15 RX ORDER — INSULIN LISPRO 100 [IU]/ML
1-5 INJECTION, SOLUTION INTRAVENOUS; SUBCUTANEOUS
Status: DISCONTINUED | OUTPATIENT
Start: 2022-11-15 | End: 2022-11-16 | Stop reason: HOSPADM

## 2022-11-15 RX ORDER — SUCRALFATE ORAL 1 G/10ML
1000 SUSPENSION ORAL 3 TIMES DAILY
Status: CANCELLED | OUTPATIENT
Start: 2022-11-15

## 2022-11-15 RX ORDER — SODIUM CHLORIDE 9 MG/ML
INJECTION, SOLUTION INTRAVENOUS CONTINUOUS PRN
Status: DISCONTINUED | OUTPATIENT
Start: 2022-11-15 | End: 2022-11-15

## 2022-11-15 RX ORDER — PROPOFOL 10 MG/ML
INJECTION, EMULSION INTRAVENOUS AS NEEDED
Status: DISCONTINUED | OUTPATIENT
Start: 2022-11-15 | End: 2022-11-15

## 2022-11-15 RX ORDER — LIDOCAINE HYDROCHLORIDE 20 MG/ML
INJECTION, SOLUTION EPIDURAL; INFILTRATION; INTRACAUDAL; PERINEURAL AS NEEDED
Status: DISCONTINUED | OUTPATIENT
Start: 2022-11-15 | End: 2022-11-15

## 2022-11-15 RX ADMIN — PROPOFOL 25 MG: 10 INJECTION, EMULSION INTRAVENOUS at 15:00

## 2022-11-15 RX ADMIN — SODIUM CHLORIDE: 0.9 INJECTION, SOLUTION INTRAVENOUS at 13:55

## 2022-11-15 RX ADMIN — PANTOPRAZOLE SODIUM 40 MG: 40 INJECTION, POWDER, FOR SOLUTION INTRAVENOUS at 08:06

## 2022-11-15 RX ADMIN — PROPOFOL 25 MG: 10 INJECTION, EMULSION INTRAVENOUS at 14:58

## 2022-11-15 RX ADMIN — PROPOFOL 25 MG: 10 INJECTION, EMULSION INTRAVENOUS at 15:05

## 2022-11-15 RX ADMIN — PROPOFOL 25 MG: 10 INJECTION, EMULSION INTRAVENOUS at 15:01

## 2022-11-15 RX ADMIN — PROPOFOL 50 MG: 10 INJECTION, EMULSION INTRAVENOUS at 14:57

## 2022-11-15 RX ADMIN — PROPOFOL 50 MG: 10 INJECTION, EMULSION INTRAVENOUS at 14:56

## 2022-11-15 RX ADMIN — PROPOFOL 25 MG: 10 INJECTION, EMULSION INTRAVENOUS at 15:03

## 2022-11-15 RX ADMIN — PROPOFOL 50 MG: 10 INJECTION, EMULSION INTRAVENOUS at 14:55

## 2022-11-15 RX ADMIN — PROPOFOL 25 MG: 10 INJECTION, EMULSION INTRAVENOUS at 14:59

## 2022-11-15 RX ADMIN — LIDOCAINE HYDROCHLORIDE 100 MG: 20 INJECTION, SOLUTION EPIDURAL; INFILTRATION; INTRACAUDAL; PERINEURAL at 14:55

## 2022-11-15 NOTE — INTERVAL H&P NOTE
H&P reviewed  After examining the patient I find no changes in the patients condition since the H&P had been written      Vitals:    11/15/22 1431   BP: 132/80   Pulse: 80   Resp: 18   Temp: 97 5 °F (36 4 °C)   SpO2: 99%

## 2022-11-15 NOTE — ASSESSMENT & PLAN NOTE
Presents with complaints of substernal chest pain, epigastric discomfort and SOB after eating at [facility] prior to falling asleep  SOB resolved and reports faint chest pain/epigastric pain currently  Unsure if patient had retained/swallowed chicken bone or food  · No prior cardiac hx  JORGE 0   · Suspect to be GI etiology, possibly retained food vs GERD, also consider 2/2 cocaine use  · CXR: NAD  · CTA chest: negative for PE, acute pathology  · CT soft tissue neck: Patent visualized airways, no definitive acute findings in  No radiopaque foreign body in aerodigestive tract total past patient right maxillary sinus with expansion extension into right ethmoidal air cells  Underlying polypoid disease not excluded, recommend follow-up with ENT  · D-Dimer: 2 35  · HS troponin: 9->24->34  · ECG: sinus tachycardia 114, nonspecific ST-T wave abnormality   · A1c: 6 0  Lipid panel: overall wnl, HDL decreased 33    · Telemetry reviewed, NSR - sinus tachycardia with few PVCs and episodes of NSVT that may be artifact  · Cardiology consulted, recommendations appreciated  · Recheck EKG and trend troponins, obtain ECHO

## 2022-11-15 NOTE — ANESTHESIA POSTPROCEDURE EVALUATION
Post-Op Assessment Note    CV Status:  Stable    Pain management: adequate     Mental Status:  Sleepy   Hydration Status:  Euvolemic   PONV Controlled:  Controlled   Airway Patency:  Patent      Post Op Vitals Reviewed: Yes      Staff: CRNA         No complications documented      BP   118/63   Temp      Pulse  80   Resp   16   SpO2   98
Assessment/Plan: 41y Female POD 12 from TEJAS/BSO now presenting with 6 days of worsening periumbilical pain, found to have non-specific findings of bowel distension in the RLQ.   ?referred pain from RLQ to umbilicus  No obstructive symptoms. Afebrile. Tolerating diet at home.     - No surgical intervention at this time  - Patient wishes to see her [facility] gynecologist for further follow up  - Continue diet as tolerated  - Pain control with Tylenol and motrin (non-narcotics)    Discussed with Dr. Nicholson  Pager [phone]

## 2022-11-15 NOTE — ANESTHESIA PREPROCEDURE EVALUATION
Procedure:  EGD    Relevant Problems   CARDIO   (+) Chest pain      GI/HEPATIC   (+) Dysphagia     Current smoker    Cardiology evaluation reviewed    Physical Exam    Airway    Mallampati score: II  TM Distance: >3 FB  Neck ROM: full     Dental       Cardiovascular  Rate: normal,     Pulmonary  Pulmonary exam normal     Other Findings        Anesthesia Plan  ASA Score- 2     Anesthesia Type- IV sedation with anesthesia with ASA Monitors  Additional Monitors:   Airway Plan:           Plan Factors-    Chart reviewed  EKG reviewed  Existing labs reviewed  Patient summary reviewed  Induction- intravenous  Postoperative Plan-     Informed Consent- Anesthetic plan and risks discussed with patient  I personally reviewed this patient with the CRNA  Discussed and agreed on the Anesthesia Plan with the CRNA  Efren Razo

## 2022-11-15 NOTE — UTILIZATION REVIEW
Initial Clinical Review    WAS OBSERVATION 11/14/2022 @ 0144, CONVERTED TO INPATIENT ADMISSION 11/15/2022 @ 1236, DUE TO CONTINUED STAY REQUIRED TO CARE FOR PATIENT WITH    Continued Medical work up including Cardio consult, EGD  Admission: Date/Time/Statement:   Admission Orders (From admission, onward)     Ordered        11/15/22 1236  Inpatient Admission  Once            11/14/22 0556  Place in Observation  Once                      Orders Placed This Encounter   Procedures   • Inpatient Admission     Standing Status:   Standing     Number of Occurrences:   1     Order Specific Question:   Level of Care     Answer:   Med Surg [16]     Order Specific Question:   Estimated length of stay     Answer:   More than 2 Midnights     Order Specific Question:   Certification     Answer:   I certify that inpatient services are medically necessary for this patient for a duration of greater than two midnights  See H&P and MD Progress Notes for additional information about the patient's course of treatment  ED Arrival Information     Expected   -    Arrival   11/14/2022 02:28    Acuity   Emergent            Means of arrival   Ambulance    Escorted by   [facility]    Service   Hospitalist    Admission type   Emergency            Arrival complaint   -           Chief Complaint   Patient presents with   • Allergic Reaction     Patient arrives via EMS with reports of allergic reaction after eating at [facility] with rash, difficulty breathing, and chest pain  EMS gave pt epi, solumedrol, and benadryl PTA       Initial Presentation: 41 y o  male, presented to the ED @ [facility], from home via EMS  Admitted as Observation due to Chest Pain  Date:  11/14/2022     Presents with concerns of chest pain, shortness of breath and possible allergic reaction starting this evening  Reports substernal chest pain and epigastric pain starting tonight after eating [employer] prior to falling asleep    Noted some shortness of breath, now resolved  No association with positions, respirations  He denies any prior cardiac history or cardiac testing  JORGE 0   CXR: NAD  CTA negative for PE   UDS pending  Trend hs trop and ECG  Hs trop: 9>24  ECG: sinus tachycardia 114, nonspecific ST-T wave abnormality  Monitor on tele  Check A1c and lipid panel  11/14/2022  Consult GI:  Chest pain, dysphagia w/ odynophagia   This is a 46-year-old male who presented via EMS due to substernal chest pain, shortness of breath and rash which began after eating [facility] chicken around 1am  He was given epinephrine, Solu-Medrol, and Benadryl PTA by EMS due to concern for food allergy  UDS +cocaine  Cardiac evaluation with EKG and troponins  Unremarkable  CXR w/ no evidence of cardiopulmonary disease  He had CTA PE study due to elevated d-dimer which was negative for PE  Reports now that he continues w/ sore throat, chest pain, and inability to tolerate anything besides liquids  Speech evaluated pt and he had no s/s aspiration w/ liquids, yogurt but declined solids except for one small bite of cookie  Sore throat w/ sensation of something in his throat & epigastric pain may be secondary to a scratch, partial food impaction, EOE, food allergy  He appears to be tolerating secretions well and tolerates liquids, but refuses any solids  Chest pain/epigastric pain reproducible w/ palpation     -Keep NPO except sips clears  -Continue IV PPI  -Continue supportive care  -Will schedule EGD    Prep and procedure explained      ED Triage Vitals   Temperature Pulse Respirations Blood Pressure SpO2   11/14/22 0242 11/14/22 0232 11/14/22 0232 11/14/22 0232 11/14/22 0232   97 7 °F (36 5 °C) (!) 124 (!) 26 129/79 100 %      Temp Source Heart Rate Source Patient Position - Orthostatic VS BP Location FiO2 (%)   11/14/22 0242 11/14/22 0232 11/14/22 0232 11/14/22 0232 --   Oral Monitor Lying Right arm       Pain Score       11/14/22 0748       4          Wt Readings from Last 1 Encounters:   11/15/22 93 9 kg (207 lb)     Additional Vital Signs:   Date/Time Temp Pulse Resp BP MAP (mmHg) SpO2 Calculated FIO2 (%) - Nasal Cannula Nasal Cannula O2 Flow Rate (L/min) O2 Device Patient Position - Orthostatic VS   11/15/22 0749 98 °F (36 7 °C) 79 20 116/65 -- 98 % -- -- -- Lying   11/15/22 0338 97 4 °F (36 3 °C) Abnormal  97 16 106/69 86 95 % -- -- None (Room air) Sitting   11/15/22 0100 97 8 °F (36 6 °C) 96 16 118/80 -- -- -- -- None (Room air) Sitting   11/14/22 2100 -- -- -- -- -- -- -- -- None (Room air) --   11/14/22 2007 96 2 °F (35 7 °C) Abnormal  98 16 116/79 87 95 % -- -- -- Lying   11/14/22 1549 97 6 °F (36 4 °C) 91 16 127/79 97 94 % -- -- -- Lying   11/14/22 1218 97 4 °F (36 3 °C) Abnormal  93 16 134/81 104 95 % -- -- None (Room air) Sitting   11/14/22 0645 97 6 °F (36 4 °C) 63 17 124/75 93 96 % 24 1 L/min Nasal cannula Lying   11/14/22 0552 -- 96 16 113/73 -- 98 % 24 1 L/min Nasal cannula Lying   11/14/22 0352 -- 108 Abnormal  16 128/69 -- 94 % 28 2 L/min Nasal cannula Sitting   11/14/22 0303 -- 112 Abnormal  18 119/71 -- 96 % -- -- None (Room air) Sitting   11/14/22 0242 97 7 °F (36 5 °C) 114 Abnormal  19 -- -- -- -- -- -- --     Pertinent Labs/Diagnostic Test Results:   CT soft tissue neck wo contrast   Final Result by Rony Taylor MD (11/14 6193)      1  Exam is compromised by lack of IV contrast   No definite acute findings within the neck  Patent visualized airways  2   No radiopaque foreign body in the visualized aerodigestive tract  3   Total opacification of the right maxillary sinus with antral expansion and extension into right ethmoidal air cells  Underlying polypoid disease is not excluded  Recommend follow-up with ENT service  CTA ED chest PE Study   Final Result by Samantha Bernal MD (11/14 5491)      No acute pathology  No pulmonary embolus        XR chest 1 view portable   ED Interpretation by Aleksandr Romero MD (11/14 5218)   No infiltrate or pneumothorax      Final Result by Melany Browning MD (11/14 0517)      No acute cardiopulmonary disease        CT abdomen pelvis wo contrast    (Results Pending)     Results from last 7 days   Lab Units 11/15/22  0507 11/14/22  0254   WBC Thousand/uL 12 51* 7 54   HEMOGLOBIN g/dL 13 3 14 4   HEMATOCRIT % 39 0 41 9   PLATELETS Thousands/uL 278 341   NEUTROS ABS Thousands/µL  --  4 70     Results from last 7 days   Lab Units 11/15/22  0507 11/14/22  0254   SODIUM mmol/L 137 136   POTASSIUM mmol/L 3 9 3 6   CHLORIDE mmol/L 103 101   CO2 mmol/L 27 23   ANION GAP mmol/L 7 12   BUN mg/dL 13 16   CREATININE mg/dL 0 62 0 94   EGFR ml/min/1 73sq m 119 97   CALCIUM mg/dL 9 4 9 4   MAGNESIUM mg/dL  --  1 9     Results from last 7 days   Lab Units 11/14/22  0254   AST U/L 18   ALT U/L 28   ALK PHOS U/L 84   TOTAL PROTEIN g/dL 6 9   ALBUMIN g/dL 3 8   TOTAL BILIRUBIN mg/dL 0 53     Results from last 7 days   Lab Units 11/15/22  0507 11/14/22  0254   GLUCOSE RANDOM mg/dL 154* 230*     Results from last 7 days   Lab Units 11/14/22  0254   HEMOGLOBIN A1C % 6 0*   EAG mg/dl 126     Results from last 7 days   Lab Units 11/15/22  1248 11/15/22  1038 11/14/22  0737 11/14/22  0428 11/14/22  0254   HS TNI 0HR ng/L  --  4  --   --  9   HS TNI 2HR ng/L 5  --   --  24  --    HSTNI D2 ng/L 1  --   --  15  --    HS TNI 4HR ng/L  --   --  34  --   --    HSTNI D4 ng/L  --   --  25*  --   --      Results from last 7 days   Lab Units 11/14/22  0254   D-DIMER QUANTITATIVE ug/ml FEU 2 35*     Results from last 7 days   Lab Units 11/15/22  0507 11/14/22  0254   TSH 3RD GENERATON uIU/mL 0 411* 6 712*     Results from last 7 days   Lab Units 11/14/22  0254   BNP pg/mL 6     Results from last 7 days   Lab Units 11/14/22  0254   LIPASE u/L 33     Results from last 7 days   Lab Units 11/14/22  1609   AMPH/METH  Negative   BARBITURATE UR  Negative   BENZODIAZEPINE UR  Negative   COCAINE UR  Positive*   METHADONE URINE  Negative   OPIATE UR  Negative   PCP UR  Negative   THC UR  Negative     Results from last 7 days   Lab Units 11/14/22  0254   ETHANOL LVL mg/dL <10     ED Treatment:   Medication Administration from 11/14/2022 0228 to 11/14/2022 0640       Date/Time Order Dose Route Action     11/14/2022 0234 diphenhydrAMINE (FOR EMS ONLY) (BENADRYL) injection 50 mg 0 mg Does not apply Given to EMS     11/14/2022 0234 EPINEPHrine PF (FOR EMS ONLY) (ADRENALIN) 1 mg/mL injection 1 mg 0 mg Does not apply Given to EMS     11/14/2022 0234 methylPREDNISolone sodium succinate (FOR EMS ONLY) (Solu-MEDROL) 125 MG injection 125 mg 0 mg Does not apply Given to EMS     11/14/2022 0252 sodium chloride 0 9 % bolus 1,000 mL 1,000 mL Intravenous New Bag     11/14/2022 0301 LORazepam (ATIVAN) injection 1 mg 1 mg Intravenous Given     11/14/2022 0511 iohexol (OMNIPAQUE) 350 MG/ML injection (SINGLE-DOSE) 100 mL 100 mL Intravenous Given     11/14/2022 0553 aspirin chewable tablet 162 mg 162 mg Oral Given        History reviewed  No pertinent past medical history  Admitting Diagnosis: Allergic reaction [T78 40XA]  Hyperglycemia [R73 9]  Allergic reaction, initial encounter [T78 40XA]  Chest pain in adult [R07 9]  Age/Sex: 41 y o  male  Admission Orders:  NPO  > EGD  Activity as tolerated  Up & OOB as tolerated  Consult ST  Telemetry    Scheduled Medications:  insulin lispro, 1-5 Units, Subcutaneous, TID AC  nicotine, 1 patch, Transdermal, Daily  pantoprazole, 40 mg, Intravenous, Q24H LIOR      Continuous IV Infusions:  lactated ringers, 100 mL/hr, Intravenous, Continuous      PRN Meds:  acetaminophen, 650 mg, Oral, Q6H PRN  aluminum-magnesium hydroxide-simethicone, 30 mL, Oral, Q4H PRN        IP CONSULT TO GASTROENTEROLOGY  IP CONSULT TO CARDIOLOGY    Network Utilization Review Department  ATTENTION: Please call with any questions or concerns to [phone] and carefully listen to the prompts so that you are directed to the right person   All voicemails are confidential   Asia sheikh requests for admission clinical reviews, approved or denied determinations and any other requests to dedicated fax number below belonging to the campus where the patient is receiving treatment   List of dedicated fax numbers for the Facilities:  [address] [place] [address] DENIALS (Administrative/Medical Necessity) [phone]   [address] (Maternity/NICU/Pediatrics) [phone]   [address] [phone]   [place] [address] [phone]   [address] [place] [address] [phone]   [address] [facility] [address] [facility] [phone]

## 2022-11-15 NOTE — PROGRESS NOTES
[address] [facility] 1975, 41 y o  male MRN: [id number]  Unit/Bed#: -Cherie Encounter: [id number]  Primary Care Provider: No primary care provider on file  Date and time admitted to hospital: 11/14/2022  2:29 AM    * Chest pain  Assessment & Plan  Presents with complaints of substernal chest pain, epigastric discomfort and SOB after eating at [facility] prior to falling asleep  SOB resolved and reports faint chest pain/epigastric pain currently  Unsure if patient had retained/swallowed chicken bone or food  · No prior cardiac hx  JORGE 0   · Suspect to be GI etiology, possibly retained food vs GERD, also consider 2/2 cocaine use  · CXR: NAD  · CTA chest: negative for PE, acute pathology  · CT soft tissue neck: Patent visualized airways, no definitive acute findings in  No radiopaque foreign body in aerodigestive tract total past patient right maxillary sinus with expansion extension into right ethmoidal air cells  Underlying polypoid disease not excluded, recommend follow-up with ENT  · D-Dimer: 2 35  · HS troponin: 9->24->34  · ECG: sinus tachycardia 114, nonspecific ST-T wave abnormality   · A1c: 6 0  Lipid panel: overall wnl, HDL decreased 33    · Telemetry reviewed, NSR - sinus tachycardia with few PVCs and episodes of NSVT that may be artifact  · Cardiology consulted, recommendations appreciated  · Recheck EKG and trend troponins, obtain ECHO    Dysphagia  Assessment & Plan  · Epigastric/throat pain with swallowing as above  · CT a/p: No acute findings  · Speech following, continue with full liquid diet advance as tolerated    Abnormal result on screening urine test  Assessment & Plan  · UDS + for cocaine  · Urine toxicology screen: Pending  · Patient denies any cocaine/drug use, cocaine use possibly related to presentation    Sinus tachycardia  Assessment & Plan  · POA with -120s, possibly related to epinephrine use  · Improved with bolus, continue IVF  · CTA negative for PE   · TSH: 6 712, re-check TSH/T4  · Continue monitoring on telemetry    Allergic reaction  Assessment & Plan  · Reports pruritic rash  No angioedema, no difficulty breathing  SOB resolved  · Faint maculopapular rash on trunk, since resolved  · S/p Benadryl, Epi and Solu-medrol in EMS  · Supportive care       VTE Pharmacologic Prophylaxis: VTE Score: 2 Low Risk (Score 0-2) - Encourage Ambulation  Patient Centered Rounds: I performed bedside rounds with nursing staff today  Discussions with Specialists or Other Care Team Provider:  GI, Cardiology, case management     Education and Discussions with Family / Patient: Attempted to update  (sister) via phone  Unable to contact  Line was busy  Time Spent for Care: 45 minutes  More than 50% of total time spent on counseling and coordination of care as described above  Current Length of Stay: 0 day(s)  Current Patient Status: Observation   Certification Statement: The patient, admitted on an observation basis, will now require > 2 midnight hospital stay due to EGD, Cardiology evaluation  Discharge Plan: Anticipate discharge in 24-48 hrs to home  Code Status: Level 1 - Full Code    Subjective:   Patient is seen at bedside this a m ,  utilized for translation  Patient reports they still have painful swallowing and epigastric/chest pain with eating, denies SOB, nausea/vomiting or palpitations  Patient denies having symptoms like this before  Objective:     Vitals:   Temp (24hrs), Av 4 °F (36 3 °C), Min:96 2 °F (35 7 °C), Max:98 °F (36 7 °C)    Temp:  [96 2 °F (35 7 °C)-98 °F (36 7 °C)] 97 3 °F (36 3 °C)  HR:  [60-98] 60  Resp:  [16-20] 20  BP: (106-142)/(65-89) 142/89  SpO2:  [94 %-98 %] 96 %  Body mass index is 33 41 kg/m²  Input and Output Summary (last 24 hours):      Intake/Output Summary (Last 24 hours) at 11/15/2022 1235  Last data filed at 2022 1901  Gross per 24 hour   Intake 1163 33 ml Output 300 ml   Net 863 33 ml       Physical Exam:   Physical Exam  Constitutional:       General: He is not in acute distress  Appearance: He is not ill-appearing, toxic-appearing or diaphoretic  Cardiovascular:      Rate and Rhythm: Normal rate and regular rhythm  Pulses: Normal pulses  Heart sounds: Normal heart sounds  Pulmonary:      Effort: Pulmonary effort is normal  No respiratory distress  Breath sounds: Normal breath sounds  Abdominal:      General: Bowel sounds are normal  There is no distension  Palpations: Abdomen is soft  Tenderness: There is abdominal tenderness  Comments: Moderate to severe tenderness to palpation of epigastric region   Musculoskeletal:         General: No swelling or tenderness  Skin:     General: Skin is warm  Neurological:      General: No focal deficit present  Mental Status: He is alert     Psychiatric:         Mood and Affect: Mood normal          Additional Data:     Labs:  Results from last 7 days   Lab Units 11/15/22  0507 11/14/22  0254   WBC Thousand/uL 12 51* 7 54   HEMOGLOBIN g/dL 13 3 14 4   HEMATOCRIT % 39 0 41 9   PLATELETS Thousands/uL 278 341   NEUTROS PCT %  --  62   LYMPHS PCT %  --  33   MONOS PCT %  --  2*   EOS PCT %  --  2     Results from last 7 days   Lab Units 11/15/22  0507 11/14/22  0254   SODIUM mmol/L 137 136   POTASSIUM mmol/L 3 9 3 6   CHLORIDE mmol/L 103 101   CO2 mmol/L 27 23   BUN mg/dL 13 16   CREATININE mg/dL 0 62 0 94   ANION GAP mmol/L 7 12   CALCIUM mg/dL 9 4 9 4   ALBUMIN g/dL  --  3 8   TOTAL BILIRUBIN mg/dL  --  0 53   ALK PHOS U/L  --  84   ALT U/L  --  28   AST U/L  --  18   GLUCOSE RANDOM mg/dL 154* 230*             Results from last 7 days   Lab Units 11/14/22  0254   HEMOGLOBIN A1C % 6 0*           Lines/Drains:  Invasive Devices  Report    Peripheral Intravenous Line  Duration           Peripheral IV 11/14/22 Right Antecubital <1 day                  Telemetry:  Telemetry Orders (From admission, onward)             48 Hour Telemetry Monitoring  Continuous x 48 hours        References:    Telemetry Guidelines   Question:  Reason for 48 Hour Telemetry  Answer:  Acute MI, chest pain - R/O MI, or unstable angina                 Telemetry Reviewed: Normal Sinus Rhythm, NSVT and PVCs  Indication for Continued Telemetry Use: Acute MI/Unstable Angina/Rule out ACS             Imaging: Reviewed radiology reports from this admission including: abdominal/pelvic CT    Recent Cultures (last 7 days):         Last 24 Hours Medication List:   Current Facility-Administered Medications   Medication Dose Route Frequency Provider Last Rate   • acetaminophen  650 mg Oral Q6H PRN Yesika Rao PA-C     • aluminum-magnesium hydroxide-simethicone  30 mL Oral Q4H PRN Yeiska Rao PA-C     • lactated ringers  100 mL/hr Intravenous Continuous Stefano Madrigal PA-C 100 mL/hr (11/14/22 1122)   • nicotine  1 patch Transdermal Daily Yesika Rao PA-C     • pantoprazole  40 mg Intravenous Q24H Kelvin Segura MD          Today, Patient Was Seen By: Maxim Lees    **Please Note: This note may have been constructed using a voice recognition system  **

## 2022-11-15 NOTE — PLAN OF CARE
Problem: PAIN - ADULT  Goal: Verbalizes/displays adequate comfort level or baseline comfort level  Description: Interventions:  - Encourage patient to monitor pain and request assistance  - Assess pain using appropriate pain scale  - Administer analgesics based on type and severity of pain and evaluate response  - Implement non-pharmacological measures as appropriate and evaluate response  - Consider cultural and social influences on pain and pain management  - Notify physician/advanced practitioner if interventions unsuccessful or patient reports new pain  Outcome: Progressing     Problem: INFECTION - ADULT  Goal: Absence or prevention of progression during hospitalization  Description: INTERVENTIONS:  - Assess and monitor for signs and symptoms of infection  - Monitor lab/diagnostic results  - Monitor all insertion sites, i e  indwelling lines, tubes, and drains  - Monitor endotracheal if appropriate and nasal secretions for changes in amount and color  - [place] appropriate cooling/warming therapies per order  - Administer medications as ordered  - Instruct and encourage patient and family to use good hand hygiene technique  - Identify and instruct in appropriate isolation precautions for identified infection/condition  Outcome: Progressing     Problem: SAFETY ADULT  Goal: Patient will remain free of falls  Description: INTERVENTIONS:  - Educate patient/family on patient safety including physical limitations  - Instruct patient to call for assistance with activity   - Consult OT/PT to assist with strengthening/mobility   - Keep Call bell within reach  - Keep bed low and locked with side rails adjusted as appropriate  - Keep care items and personal belongings within reach  - Initiate and maintain comfort rounds  - Make Fall Risk Sign visible to staff  - Offer Toileting every 2 Hours, in advance of need  - Initiate/Maintain alarm  - Obtain necessary fall risk management equipment:   - Apply yellow socks and bracelet for high fall risk patients  - Consider moving patient to room near nurses station  Outcome: Progressing  Goal: Maintain or return to baseline ADL function  Description: INTERVENTIONS:  -  Assess patient's ability to carry out ADLs; assess patient's baseline for ADL function and identify physical deficits which impact ability to perform ADLs (bathing, care of mouth/teeth, toileting, grooming, dressing, etc )  - Assess/evaluate cause of self-care deficits   - Assess range of motion  - Assess patient's mobility; develop plan if impaired  - Assess patient's need for assistive devices and provide as appropriate  - Encourage maximum independence but intervene and supervise when necessary  - Involve family in performance of ADLs  - Assess for home care needs following discharge   - Consider OT consult to assist with ADL evaluation and planning for discharge  - Provide patient education as appropriate  Outcome: Progressing  Goal: Maintains/Returns to pre admission functional level  Description: INTERVENTIONS:  - Perform BMAT or MOVE assessment daily    - Set and communicate daily mobility goal to care team and patient/family/caregiver  - Collaborate with rehabilitation services on mobility goals if consulted  - Perform Range of Motion 2 times a day  - Reposition patient every 2 hours    - Dangle patient 2 times a day  - Stand patient 2 times a day  - Ambulate patient 2 times a day  - Out of bed to chair 2 times a day   - Out of bed for meals 2 times a day  - Out of bed for toileting  - Record patient progress and toleration of activity level   Outcome: Progressing     Problem: DISCHARGE PLANNING  Goal: Discharge to home or other facility with appropriate resources  Description: INTERVENTIONS:  - Identify barriers to discharge w/patient and caregiver  - Arrange for needed discharge resources and transportation as appropriate  - Identify discharge learning needs (meds, wound care, etc )  - Arrange for interpretive services to assist at discharge as needed  - Refer to Case Management Department for coordinating discharge planning if the patient needs post-hospital services based on physician/advanced practitioner order or complex needs related to functional status, cognitive ability, or social support system  Outcome: Progressing     Problem: Knowledge Deficit  Goal: Patient/family/caregiver demonstrates understanding of disease process, treatment plan, medications, and discharge instructions  Description: Complete learning assessment and assess knowledge base    Interventions:  - Provide teaching at level of understanding  - Provide teaching via preferred learning methods  Outcome: Progressing

## 2022-11-15 NOTE — ASSESSMENT & PLAN NOTE
· UDS + for cocaine  · Urine toxicology screen: Pending  · Patient denies any cocaine/drug use, cocaine use possibly related to presentation

## 2022-11-15 NOTE — ASSESSMENT & PLAN NOTE
· POA with -120s, possibly related to epinephrine use  · Improved with bolus, continue IVF  · CTA negative for PE   · TSH: 6 712, re-check TSH/T4  · Continue monitoring on telemetry

## 2022-11-15 NOTE — ASSESSMENT & PLAN NOTE
· Reports pruritic rash  No angioedema, no difficulty breathing  SOB resolved     · Faint maculopapular rash on trunk, since resolved  · S/p Benadryl, Epi and Solu-medrol in EMS  · Supportive care

## 2022-11-15 NOTE — H&P (VIEW-ONLY)
Consultation - [facility] Gastroenterology   Westley Vega 41 y o  male MRN: [id number]  Unit/Bed#: -01 Encounter: [id number]        Inpatient consult to gastroenterology  Consult performed by: MARIAH Rose  Consult ordered by: Mayte Santana MD          Reason for Consult / Principal Problem:     Odynophagia, dysphagia      ASSESSMENT AND PLAN:      1  Chest pain, dysphagia w/ odynophagia   This is a 80-year-old male who presented via EMS due to substernal chest pain, shortness of breath and rash which began after eating [employer] chicken around 1am  He was given epinephrine, Solu-Medrol, and Benadryl PTA by EMS due to concern for food allergy  UDS +cocaine  Cardiac evaluation with EKG and troponins  Unremarkable  CXR w/ no evidence of cardiopulmonary disease  He had CTA PE study due to elevated d-dimer which was negative for PE  Reports now that he continues w/ sore throat, chest pain, and inability to tolerate anything besides liquids  Speech evaluated pt and he had no s/s aspiration w/ liquids, yogurt but declined solids except for one small bite of cookie  Sore throat w/ sensation of something in his throat & epigastric pain may be secondary to a scratch, partial food impaction, EOE, food allergy  He appears to be tolerating secretions well and tolerates liquids, but refuses any solids  Chest pain/epigastric pain reproducible w/ palpation      -Keep NPO except sips clears  -Continue IV PPI  -Continue supportive care  -Will schedule EGD for further evaluation tomorrow  Prep and procedure explained        ______________________________________________________________________    HPI:  Westley Vega is a 41 y o  male who presented to the ED this morning due to substernal chest pain, shortness of breath, and rash which began after eating [employer] chicken around 1am   He was given epinephrine, Solu-Medrol, and Benadryl prior to admission via EMS for concern for food allergy   In the ED was tachycardic and tachypneic with oxygen saturation of 100% on room air  EKG performed showed sinus tachycardia with nonspecific ST-T wave abnormality, no definite evidence of acute ischemia  Troponins negative x3  D-dimer was noted to be elevated 2 35 and CTA PE study performed showing no evidence of PE  CT soft tissue neck performed wo contrast which showed no definitive acute findings in the neck and patent visualized airways but exam limited due to lack of contrast  UDS positive for cocaine  Patient denies any history of heartburn or trouble swallowing in the past   He currently still has a sore throat and chest pain & reports chest pain is worse with palpation  He states that he can not tolerate anything more than liquids at this time  He worked with speech therapy earlier today and declined anything more than 1 bite of soft cookie, but took individual sips of liquids with no signs or symptoms of aspiration and denied symptoms with yogurt  He is tolerating his secretions  Reports history of laparoscopic abdominal surgery 6 or 7 years ago in [facility], but is not able to tell me what surgery was performed  During our conversation he became upset due to questions regarding drug use and reported increased SOB/CP and threw his heart monitor off stating he was going to leave  Once he calmed down, reported shortness of breath had improved  Nursing notified to replace heart monitor  He adamantly denies any drug use  REVIEW OF SYSTEMS:    CONSTITUTIONAL: Denies any fever, chills, rigors, and weight loss  HEENT: No earache or tinnitus  Denies hearing loss or visual disturbances  CARDIOVASCULAR: No chest pain or palpitations  RESPIRATORY: Denies any cough, hemoptysis, shortness of breath or dyspnea on exertion  GASTROINTESTINAL: As noted in the History of Present Illness  GENITOURINARY: No problems with urination  Denies any hematuria or dysuria    NEUROLOGIC: No dizziness or vertigo, denies headaches  MUSCULOSKELETAL: Denies any muscle or joint pain  SKIN: Denies skin rashes or itching  ENDOCRINE: Denies excessive thirst  Denies intolerance to heat or cold  PSYCHOSOCIAL: Denies depression or anxiety  Denies any recent memory loss  Historical Information   History reviewed  No pertinent past medical history  History reviewed  No pertinent surgical history  Social History   Social History     Substance and Sexual Activity   Alcohol Use Not Currently    Comment: socially     Social History     Substance and Sexual Activity   Drug Use No     Social History     Tobacco Use   Smoking Status Current Every Day Smoker   • Packs/day: 1 00   • Years: 23 00   • Pack years: 23 00   • Types: Cigarettes   Smokeless Tobacco Never Used     History reviewed  No pertinent family history  Meds/Allergies     No medications prior to admission  Current Facility-Administered Medications   Medication Dose Route Frequency   • acetaminophen (TYLENOL) tablet 650 mg  650 mg Oral Q6H PRN   • aluminum-magnesium hydroxide-simethicone (MYLANTA) oral suspension 30 mL  30 mL Oral Q4H PRN   • famotidine (PEPCID) tablet 20 mg  20 mg Oral BID   • lactated ringers infusion  100 mL/hr Intravenous Continuous   • nicotine (NICODERM CQ) 14 mg/24hr TD 24 hr patch 1 patch  1 patch Transdermal Daily   • pantoprazole (PROTONIX) injection 40 mg  40 mg Intravenous Q24H [address]       Allergies   Allergen Reactions   • Shellfish-Derived Products - Food Allergy Rash           Objective     Blood pressure 127/79, pulse 91, temperature 97 6 °F (36 4 °C), temperature source Tympanic, resp  rate 16, height 5' 6" (1 676 m), weight 93 9 kg (207 lb), SpO2 94 %  Body mass index is 33 41 kg/m²        Intake/Output Summary (Last 24 hours) at 11/14/2022 2004  Last data filed at 11/14/2022 1901  Gross per 24 hour   Intake 2343 33 ml   Output 300 ml   Net 2043 33 ml         PHYSICAL EXAM:      General Appearance:   Alert, cooperative, no distress HEENT:   Normocephalic, atraumatic, anicteric  Neck:  Supple, symmetrical, trachea midline   Lungs:   Clear to auscultation bilaterally; no rales, rhonchi or wheezing; respirations unlabored    Heart[de-identified]   Regular rate and rhythm; no murmur, rub, or gallop     Abdomen:   Soft, epigastric area tender, non-distended; normal bowel sounds; no masses, no organomegaly    Genitalia:   Deferred    Rectal:   Deferred    Extremities:  No cyanosis, clubbing or edema    Pulses:  2+ and symmetric all extremities    Skin:  No jaundice, rashes, or lesions    Lymph nodes:  No palpable cervical lymphadenopathy        Lab Results:   Admission on 11/14/2022   Component Date Value   • WBC 11/14/2022 7 54    • RBC 11/14/2022 4 90    • Hemoglobin 11/14/2022 14 4    • Hematocrit 11/14/2022 41 9    • MCV 11/14/2022 86    • MCH 11/14/2022 29 4    • MCHC 11/14/2022 34 4    • RDW 11/14/2022 13 4    • MPV 11/14/2022 9 4    • Platelets 11/96/3703 341    • nRBC 11/14/2022 0    • Neutrophils Relative 11/14/2022 62    • Immat GRANS % 11/14/2022 1    • Lymphocytes Relative 11/14/2022 33    • Monocytes Relative 11/14/2022 2 (A)   • Eosinophils Relative 11/14/2022 2    • Basophils Relative 11/14/2022 0    • Neutrophils Absolute 11/14/2022 4 70    • Immature Grans Absolute 11/14/2022 0 08    • Lymphocytes Absolute 11/14/2022 2 48    • Monocytes Absolute 11/14/2022 0 15 (A)   • Eosinophils Absolute 11/14/2022 0 12    • Basophils Absolute 11/14/2022 0 01    • Sodium 11/14/2022 136    • Potassium 11/14/2022 3 6    • Chloride 11/14/2022 101    • CO2 11/14/2022 23    • ANION GAP 11/14/2022 12    • BUN 11/14/2022 16    • Creatinine 11/14/2022 0 94    • Glucose 11/14/2022 230 (A)   • Calcium 11/14/2022 9 4    • AST 11/14/2022 18    • ALT 11/14/2022 28    • Alkaline Phosphatase 11/14/2022 84    • Total Protein 11/14/2022 6 9    • Albumin 11/14/2022 3 8    • Total Bilirubin 11/14/2022 0 53    • eGFR 11/14/2022 97    • Magnesium 11/14/2022 1 9    • hs TnI 0hr 11/14/2022 9    • BNP 11/14/2022 6    • Amph/Meth UR 11/14/2022 Negative    • Barbiturate Ur 11/14/2022 Negative    • Benzodiazepine Urine 11/14/2022 Negative    • Cocaine Urine 11/14/2022 Positive (A)   • Methadone Urine 11/14/2022 Negative    • Opiate Urine 11/14/2022 Negative    • PCP Ur 11/14/2022 Negative    • THC Urine 11/14/2022 Negative    • Oxycodone Urine 11/14/2022 Negative    • Ethanol Lvl 11/14/2022 <10    • D-Dimer, Quant 11/14/2022 2 35 (A)   • hs TnI 2hr 11/14/2022 24    • Delta 2hr hsTnI 11/14/2022 15    • hs TnI 4hr 11/14/2022 34    • Delta 4hr hsTnI 11/14/2022 25 (A)   • TSH 3RD GENERATON 11/14/2022 6 712 (A)   • Hemoglobin A1C 11/14/2022 6 0 (A)   • EAG 11/14/2022 126    • Cholesterol 11/14/2022 130    • Triglycerides 11/14/2022 146    • HDL, Direct 11/14/2022 33 (A)   • LDL Calculated 11/14/2022 68    • Non-HDL-Chol (CHOL-HDL) 11/14/2022 97    • Free T4 11/14/2022 1 08        Imaging Studies: I have personally reviewed pertinent imaging studies

## 2022-11-15 NOTE — ASSESSMENT & PLAN NOTE
· Epigastric/throat pain with swallowing as above  · CT a/p: No acute findings  · Speech following, continue with full liquid diet advance as tolerated

## 2022-11-15 NOTE — CONSULTS
[facility] Cardiology  CONSULT    Patient Name: Zayra Brown  Patient MRN: [id number]  Admission Date: 11/14/2022  2:29 AM  Attending Provider: Yael Elder DO  Service: Cardiology    Reason for consult: Chest pain    [place] : Tripp Silva #[id number]    HPI  This is a 41 y o  male who initially presented on 11/14/2022 with chest pain  Chest pain felt like a pressure sensation in the center of his chest and occurred after eating fried chicken and laying down  He had no associated nausea, vomiting, diaphoresis or shortness of breath  He did have a maculopapular rash on presentation  EMS administered benadryl, solumedrol and epinephrine for possible allergic reaction  He has been evaluated by GI and plan is for EGD today for dysphagia and odynophagia  Cardiology was consulted for chest pain  He denies any prior episodes of chest discomfort  He complains of throat pain and trouble swallowing  Denies fever, chills, lightheadedness, visual changes, palpitations, shortness of breath at rest, orthopnea, leg edema  He is not very active and complains of shortness of breath when he exerts himself  He denies any personal history of hypertension, diabetes, hyperlipidemia, myocardial infarction, TIA/CVA, congestive heart failure  He is not aware of his family medical history  He denies cigarette smoking but does admit to smoking cigars  He denies illicit drug use including amphetamines or cocaine  Review of Systems  10 point review of systems negative except as noted in HPI    History reviewed  No pertinent past medical history  History reviewed  No pertinent surgical history  History reviewed  No pertinent family history      Social History     Tobacco Use   • Smoking status: Current Every Day Smoker     Packs/day: 1 00     Years: 23 00     Pack years: 23 00     Types: Cigarettes   • Smokeless tobacco: Never Used   Substance Use Topics   • Alcohol use: Not Currently     Comment: socially     Vitals: 11/15/22 1140   BP: 142/89   Pulse: 60   Resp: 20   Temp: (!) 97 3 °F (36 3 °C)   SpO2: 96%      I/O last 3 completed shifts: In: 2343 3 [P O :180; I V :1163 3; IV Piggyback:1000]  Out: 300 [Urine:300]  No intake/output data recorded  Oxygen:  O2 Device: None (Room air)    Physical Exam  General Appearance: Alert, cooperative, no distress, appears stated age  Head: Normocephalic, without obvious abnormality, atraumatic  Eyes: PERRL, conjunctiva/corneas clear, EOM's intact  Neck: No carotid bruit or JVD  Lungs: Clear to auscultation bilaterally, respirations unlabored, no wheezes, rales  Heart: rrr, S1 and S2 normal, no murmur, no rub, ttp over xiphoid and epigastrium  Abdomen: Soft, non-tender, bowel sounds active  Extremities: Extremities normal, atraumatic, no edema  Pulses: 2+ and symmetric all extremities  Skin: Warm and dry without and rashes or lesions  Neurologic: CNII-XII grossly intact      Current Medications:  Scheduled Meds:  Current Facility-Administered Medications   Medication Dose Route Frequency Provider Last Rate   • acetaminophen  650 mg Oral Q6H PRN Yesika Jerez PA-C     • aluminum-magnesium hydroxide-simethicone  30 mL Oral Q4H PRN Yesika Jerez PA-C     • insulin lispro  1-5 Units Subcutaneous TID AC Rosangela Dotson PA-C     • lactated ringers  100 mL/hr Intravenous Continuous Orvil RINA Avery 100 mL/hr (11/14/22 1013)   • nicotine  1 patch Transdermal Daily Yesika Jerez PA-C     • pantoprazole  40 mg Intravenous Q24H [address] Day Lou MD       Continuous Infusions:lactated ringers, 100 mL/hr, Last Rate: 100 mL/hr (11/14/22 9683)      PRN Meds: •  acetaminophen  •  aluminum-magnesium hydroxide-simethicone    Laboratory Studies:  Lab Results   Component Value Date    WBC 12 51 (H) 11/15/2022    HGB 13 3 11/15/2022    HCT 39 0 11/15/2022    MCV 86 11/15/2022     11/15/2022       Lab Results   Component Value Date CALCIUM 9 4 11/15/2022    SODIUM 137 11/15/2022    K 3 9 11/15/2022    CO2 27 11/15/2022     11/15/2022    BUN 13 11/15/2022    CREATININE 0 62 11/15/2022       Lab Results   Component Value Date    HGBA1C 6 0 (H) 11/14/2022       Lab Results   Component Value Date    HDL 33 (L) 11/14/2022    LDLCALC 68 11/14/2022    TRIG 146 11/14/2022       CARDIAC IMAGING:  Echocardiogram: No prior    Cardiac Cath: No prior     Stress Test: No prior    ECG: All ECGs personally review    IMAGING  CT abdomen pelvis wo contrast   Final Result      No acute inflammatory stranding   No bowel obstruction   No peripancreatic fluid collection         Workstation performed: [id number]         CT soft tissue neck wo contrast   Final Result      1  Exam is compromised by lack of IV contrast   No definite acute findings within the neck  Patent visualized airways  2   No radiopaque foreign body in the visualized aerodigestive tract  3   Total opacification of the right maxillary sinus with antral expansion and extension into right ethmoidal air cells  Underlying polypoid disease is not excluded  Recommend follow-up with ENT service  Workstation performed: [id number]         CTA ED chest PE Study   Final Result      No acute pathology  No pulmonary embolus  Workstation performed: [id number]         XR chest 1 view portable   ED Interpretation   No infiltrate or pneumothorax      Final Result      No acute cardiopulmonary disease  Workstation performed: [id number]            Assessment / Plan  This is a 41 y o  male who presents with    #  Dysphagia and odynophagia  #  Reproducible chest wall pain  #  UDS positive for cocaine  #  MONTALVO  #  Obesity    41year old Amharic-speaking gentleman with no significant past medical history who presented with chest pain  All ECGs reviewed and no significant ST/T changes  High sensitivity troponin 9, 24, 34   Exam with reproducible pain over the lower sternum and epigastrium  No signs/symptoms of congestive heart failure  No acute coronary syndrome  Check echocardiogram given complaints of dyspnea with exertion and cocaine abuse  No contraindications to EGD as scheduled      Principal Problem:    Chest pain  Active Problems:    Sinus tachycardia    Allergic reaction    Abnormal result on screening urine test    Dysphagia    Code Status: Level 1 - Full Code    Belkis Fountain MD

## 2022-11-15 NOTE — CONSULTS
Consultation - [facility] Gastroenterology   Amos Shaw 41 y o  male MRN: [id number]  Unit/Bed#: -01 Encounter: [id number]        Inpatient consult to gastroenterology  Consult performed by: MARIAH Almeida  Consult ordered by: Jarett Holden MD          Reason for Consult / Principal Problem:     Odynophagia, dysphagia      ASSESSMENT AND PLAN:      1  Chest pain, dysphagia w/ odynophagia   This is a 72-year-old male who presented via EMS due to substernal chest pain, shortness of breath and rash which began after eating [employer] chicken around 1am  He was given epinephrine, Solu-Medrol, and Benadryl PTA by EMS due to concern for food allergy  UDS +cocaine  Cardiac evaluation with EKG and troponins  Unremarkable  CXR w/ no evidence of cardiopulmonary disease  He had CTA PE study due to elevated d-dimer which was negative for PE  Reports now that he continues w/ sore throat, chest pain, and inability to tolerate anything besides liquids  Speech evaluated pt and he had no s/s aspiration w/ liquids, yogurt but declined solids except for one small bite of cookie  Sore throat w/ sensation of something in his throat & epigastric pain may be secondary to a scratch, partial food impaction, EOE, food allergy  He appears to be tolerating secretions well and tolerates liquids, but refuses any solids  Chest pain/epigastric pain reproducible w/ palpation      -Keep NPO except sips clears  -Continue IV PPI  -Continue supportive care  -Will schedule EGD for further evaluation tomorrow  Prep and procedure explained        ______________________________________________________________________    HPI:  Amos Shaw is a 41 y o  male who presented to the ED this morning due to substernal chest pain, shortness of breath, and rash which began after eating [employer] chicken around 1am   He was given epinephrine, Solu-Medrol, and Benadryl prior to admission via EMS for concern for food allergy   In the ED was tachycardic and tachypneic with oxygen saturation of 100% on room air  EKG performed showed sinus tachycardia with nonspecific ST-T wave abnormality, no definite evidence of acute ischemia  Troponins negative x3  D-dimer was noted to be elevated 2 35 and CTA PE study performed showing no evidence of PE  CT soft tissue neck performed wo contrast which showed no definitive acute findings in the neck and patent visualized airways but exam limited due to lack of contrast  UDS positive for cocaine  Patient denies any history of heartburn or trouble swallowing in the past   He currently still has a sore throat and chest pain & reports chest pain is worse with palpation  He states that he can not tolerate anything more than liquids at this time  He worked with speech therapy earlier today and declined anything more than 1 bite of soft cookie, but took individual sips of liquids with no signs or symptoms of aspiration and denied symptoms with yogurt  He is tolerating his secretions  Reports history of laparoscopic abdominal surgery 6 or 7 years ago in [facility], but is not able to tell me what surgery was performed  During our conversation he became upset due to questions regarding drug use and reported increased SOB/CP and threw his heart monitor off stating he was going to leave  Once he calmed down, reported shortness of breath had improved  Nursing notified to replace heart monitor  He adamantly denies any drug use  REVIEW OF SYSTEMS:    CONSTITUTIONAL: Denies any fever, chills, rigors, and weight loss  HEENT: No earache or tinnitus  Denies hearing loss or visual disturbances  CARDIOVASCULAR: No chest pain or palpitations  RESPIRATORY: Denies any cough, hemoptysis, shortness of breath or dyspnea on exertion  GASTROINTESTINAL: As noted in the History of Present Illness  GENITOURINARY: No problems with urination  Denies any hematuria or dysuria    NEUROLOGIC: No dizziness or vertigo, denies headaches  MUSCULOSKELETAL: Denies any muscle or joint pain  SKIN: Denies skin rashes or itching  ENDOCRINE: Denies excessive thirst  Denies intolerance to heat or cold  PSYCHOSOCIAL: Denies depression or anxiety  Denies any recent memory loss  Historical Information   History reviewed  No pertinent past medical history  History reviewed  No pertinent surgical history  Social History   Social History     Substance and Sexual Activity   Alcohol Use Not Currently    Comment: socially     Social History     Substance and Sexual Activity   Drug Use No     Social History     Tobacco Use   Smoking Status Current Every Day Smoker   • Packs/day: 1 00   • Years: 23 00   • Pack years: 23 00   • Types: Cigarettes   Smokeless Tobacco Never Used     History reviewed  No pertinent family history  Meds/Allergies     No medications prior to admission  Current Facility-Administered Medications   Medication Dose Route Frequency   • acetaminophen (TYLENOL) tablet 650 mg  650 mg Oral Q6H PRN   • aluminum-magnesium hydroxide-simethicone (MYLANTA) oral suspension 30 mL  30 mL Oral Q4H PRN   • famotidine (PEPCID) tablet 20 mg  20 mg Oral BID   • lactated ringers infusion  100 mL/hr Intravenous Continuous   • nicotine (NICODERM CQ) 14 mg/24hr TD 24 hr patch 1 patch  1 patch Transdermal Daily   • pantoprazole (PROTONIX) injection 40 mg  40 mg Intravenous Q24H [address]       Allergies   Allergen Reactions   • Shellfish-Derived Products - Food Allergy Rash           Objective     Blood pressure 127/79, pulse 91, temperature 97 6 °F (36 4 °C), temperature source Tympanic, resp  rate 16, height 5' 6" (1 676 m), weight 93 9 kg (207 lb), SpO2 94 %  Body mass index is 33 41 kg/m²        Intake/Output Summary (Last 24 hours) at 11/14/2022 2004  Last data filed at 11/14/2022 1901  Gross per 24 hour   Intake 2343 33 ml   Output 300 ml   Net 2043 33 ml         PHYSICAL EXAM:      General Appearance:   Alert, cooperative, no distress HEENT:   Normocephalic, atraumatic, anicteric  Neck:  Supple, symmetrical, trachea midline   Lungs:   Clear to auscultation bilaterally; no rales, rhonchi or wheezing; respirations unlabored    Heart[de-identified]   Regular rate and rhythm; no murmur, rub, or gallop     Abdomen:   Soft, epigastric area tender, non-distended; normal bowel sounds; no masses, no organomegaly    Genitalia:   Deferred    Rectal:   Deferred    Extremities:  No cyanosis, clubbing or edema    Pulses:  2+ and symmetric all extremities    Skin:  No jaundice, rashes, or lesions    Lymph nodes:  No palpable cervical lymphadenopathy        Lab Results:   Admission on 11/14/2022   Component Date Value   • WBC 11/14/2022 7 54    • RBC 11/14/2022 4 90    • Hemoglobin 11/14/2022 14 4    • Hematocrit 11/14/2022 41 9    • MCV 11/14/2022 86    • MCH 11/14/2022 29 4    • MCHC 11/14/2022 34 4    • RDW 11/14/2022 13 4    • MPV 11/14/2022 9 4    • Platelets 51/93/2391 341    • nRBC 11/14/2022 0    • Neutrophils Relative 11/14/2022 62    • Immat GRANS % 11/14/2022 1    • Lymphocytes Relative 11/14/2022 33    • Monocytes Relative 11/14/2022 2 (A)   • Eosinophils Relative 11/14/2022 2    • Basophils Relative 11/14/2022 0    • Neutrophils Absolute 11/14/2022 4 70    • Immature Grans Absolute 11/14/2022 0 08    • Lymphocytes Absolute 11/14/2022 2 48    • Monocytes Absolute 11/14/2022 0 15 (A)   • Eosinophils Absolute 11/14/2022 0 12    • Basophils Absolute 11/14/2022 0 01    • Sodium 11/14/2022 136    • Potassium 11/14/2022 3 6    • Chloride 11/14/2022 101    • CO2 11/14/2022 23    • ANION GAP 11/14/2022 12    • BUN 11/14/2022 16    • Creatinine 11/14/2022 0 94    • Glucose 11/14/2022 230 (A)   • Calcium 11/14/2022 9 4    • AST 11/14/2022 18    • ALT 11/14/2022 28    • Alkaline Phosphatase 11/14/2022 84    • Total Protein 11/14/2022 6 9    • Albumin 11/14/2022 3 8    • Total Bilirubin 11/14/2022 0 53    • eGFR 11/14/2022 97    • Magnesium 11/14/2022 1 9    • hs TnI 0hr 11/14/2022 9    • BNP 11/14/2022 6    • Amph/Meth UR 11/14/2022 Negative    • Barbiturate Ur 11/14/2022 Negative    • Benzodiazepine Urine 11/14/2022 Negative    • Cocaine Urine 11/14/2022 Positive (A)   • Methadone Urine 11/14/2022 Negative    • Opiate Urine 11/14/2022 Negative    • PCP Ur 11/14/2022 Negative    • THC Urine 11/14/2022 Negative    • Oxycodone Urine 11/14/2022 Negative    • Ethanol Lvl 11/14/2022 <10    • D-Dimer, Quant 11/14/2022 2 35 (A)   • hs TnI 2hr 11/14/2022 24    • Delta 2hr hsTnI 11/14/2022 15    • hs TnI 4hr 11/14/2022 34    • Delta 4hr hsTnI 11/14/2022 25 (A)   • TSH 3RD GENERATON 11/14/2022 6 712 (A)   • Hemoglobin A1C 11/14/2022 6 0 (A)   • EAG 11/14/2022 126    • Cholesterol 11/14/2022 130    • Triglycerides 11/14/2022 146    • HDL, Direct 11/14/2022 33 (A)   • LDL Calculated 11/14/2022 68    • Non-HDL-Chol (CHOL-HDL) 11/14/2022 97    • Free T4 11/14/2022 1 08        Imaging Studies: I have personally reviewed pertinent imaging studies

## 2022-11-16 ENCOUNTER — APPOINTMENT (INPATIENT)
Dept: NON INVASIVE DIAGNOSTICS | Facility: HOSPITAL | Age: 47
End: 2022-11-16

## 2022-11-16 VITALS
SYSTOLIC BLOOD PRESSURE: 128 MMHG | TEMPERATURE: 97.8 F | OXYGEN SATURATION: 97 % | HEIGHT: 66 IN | BODY MASS INDEX: 33.27 KG/M2 | DIASTOLIC BLOOD PRESSURE: 80 MMHG | RESPIRATION RATE: 17 BRPM | HEART RATE: 74 BPM | WEIGHT: 207 LBS

## 2022-11-16 LAB
ALBUMIN SERPL BCP-MCNC: 3.6 G/DL (ref 3.5–5)
ALP SERPL-CCNC: 62 U/L (ref 34–104)
ALT SERPL W P-5'-P-CCNC: 23 U/L (ref 7–52)
ANION GAP SERPL CALCULATED.3IONS-SCNC: 8 MMOL/L (ref 4–13)
AORTIC ROOT: 3.1 CM
APICAL FOUR CHAMBER EJECTION FRACTION: 59 %
ASCENDING AORTA: 2.8 CM
AST SERPL W P-5'-P-CCNC: 16 U/L (ref 13–39)
BASOPHILS # BLD AUTO: 0.01 THOUSANDS/ÂΜL (ref 0–0.1)
BASOPHILS NFR BLD AUTO: 0 % (ref 0–1)
BILIRUB SERPL-MCNC: 0.45 MG/DL (ref 0.2–1)
BUN SERPL-MCNC: 15 MG/DL (ref 5–25)
CALCIUM SERPL-MCNC: 9.1 MG/DL (ref 8.4–10.2)
CHLORIDE SERPL-SCNC: 105 MMOL/L (ref 96–108)
CO2 SERPL-SCNC: 27 MMOL/L (ref 21–32)
CREAT SERPL-MCNC: 0.68 MG/DL (ref 0.6–1.3)
E WAVE DECELERATION TIME: 192 MS
EOSINOPHIL # BLD AUTO: 0.14 THOUSAND/ÂΜL (ref 0–0.61)
EOSINOPHIL NFR BLD AUTO: 2 % (ref 0–6)
ERYTHROCYTE [DISTWIDTH] IN BLOOD BY AUTOMATED COUNT: 13.3 % (ref 11.6–15.1)
FRACTIONAL SHORTENING: 27 % (ref 28–44)
GFR SERPL CREATININE-BSD FRML MDRD: 115 ML/MIN/1.73SQ M
GLUCOSE SERPL-MCNC: 113 MG/DL (ref 65–140)
GLUCOSE SERPL-MCNC: 114 MG/DL (ref 65–140)
HCT VFR BLD AUTO: 38.4 % (ref 36.5–49.3)
HGB BLD-MCNC: 13.1 G/DL (ref 12–17)
IMM GRANULOCYTES # BLD AUTO: 0.02 THOUSAND/UL (ref 0–0.2)
IMM GRANULOCYTES NFR BLD AUTO: 0 % (ref 0–2)
INTERVENTRICULAR SEPTUM IN DIASTOLE (PARASTERNAL SHORT AXIS VIEW): 0.9 CM
INTERVENTRICULAR SEPTUM: 0.9 CM (ref 0.6–1.1)
LAAS-AP2: 17.2 CM2
LAAS-AP4: 14.8 CM2
LEFT ATRIUM SIZE: 3.6 CM
LEFT INTERNAL DIMENSION IN SYSTOLE: 3.2 CM (ref 2.1–4)
LEFT VENTRICULAR INTERNAL DIMENSION IN DIASTOLE: 4.4 CM (ref 3.5–6)
LEFT VENTRICULAR POSTERIOR WALL IN END DIASTOLE: 0.9 CM
LEFT VENTRICULAR STROKE VOLUME: 47 ML
LVSV (TEICH): 47 ML
LYMPHOCYTES # BLD AUTO: 2.17 THOUSANDS/ÂΜL (ref 0.6–4.47)
LYMPHOCYTES NFR BLD AUTO: 37 % (ref 14–44)
MCH RBC QN AUTO: 29.2 PG (ref 26.8–34.3)
MCHC RBC AUTO-ENTMCNC: 34.1 G/DL (ref 31.4–37.4)
MCV RBC AUTO: 86 FL (ref 82–98)
MONOCYTES # BLD AUTO: 0.33 THOUSAND/ÂΜL (ref 0.17–1.22)
MONOCYTES NFR BLD AUTO: 6 % (ref 4–12)
MV E'TISSUE VEL-SEP: 9 CM/S
MV PEAK A VEL: 0.65 M/S
MV PEAK E VEL: 89 CM/S
MV STENOSIS PRESSURE HALF TIME: 56 MS
MV VALVE AREA P 1/2 METHOD: 3.93 CM2
NEUTROPHILS # BLD AUTO: 3.28 THOUSANDS/ÂΜL (ref 1.85–7.62)
NEUTS SEG NFR BLD AUTO: 55 % (ref 43–75)
NRBC BLD AUTO-RTO: 0 /100 WBCS
PLATELET # BLD AUTO: 217 THOUSANDS/UL (ref 149–390)
PMV BLD AUTO: 9.8 FL (ref 8.9–12.7)
POTASSIUM SERPL-SCNC: 3.7 MMOL/L (ref 3.5–5.3)
PROT SERPL-MCNC: 7 G/DL (ref 6.4–8.4)
RBC # BLD AUTO: 4.49 MILLION/UL (ref 3.88–5.62)
RIGHT ATRIUM AREA SYSTOLE A4C: 11.9 CM2
RIGHT VENTRICLE ID DIMENSION: 3.9 CM
SL CV LEFT ATRIUM LENGTH A2C: 5.4 CM
SL CV LV EF: 60
SL CV PED ECHO LEFT VENTRICLE DIASTOLIC VOLUME (MOD BIPLANE) 2D: 86 ML
SL CV PED ECHO LEFT VENTRICLE SYSTOLIC VOLUME (MOD BIPLANE) 2D: 40 ML
SODIUM SERPL-SCNC: 140 MMOL/L (ref 135–147)
TR MAX PG: 21 MMHG
TR PEAK VELOCITY: 2.3 M/S
TRICUSPID VALVE PEAK REGURGITATION VELOCITY: 2.3 M/S
WBC # BLD AUTO: 5.95 THOUSAND/UL (ref 4.31–10.16)

## 2022-11-16 RX ORDER — SUCRALFATE ORAL 1 G/10ML
1 SUSPENSION ORAL 4 TIMES DAILY
Qty: 560 ML | Refills: 0 | Status: SHIPPED | OUTPATIENT
Start: 2022-11-16 | End: 2022-11-30

## 2022-11-16 RX ORDER — ACETAMINOPHEN 325 MG/1
650 TABLET ORAL EVERY 6 HOURS PRN
Qty: 30 TABLET | Refills: 0 | Status: SHIPPED | OUTPATIENT
Start: 2022-11-16

## 2022-11-16 RX ORDER — PANTOPRAZOLE SODIUM 40 MG/1
40 TABLET, DELAYED RELEASE ORAL DAILY
Qty: 30 TABLET | Refills: 0 | Status: SHIPPED | OUTPATIENT
Start: 2022-11-16 | End: 2022-12-16

## 2022-11-16 RX ADMIN — PANTOPRAZOLE SODIUM 40 MG: 40 INJECTION, POWDER, FOR SOLUTION INTRAVENOUS at 10:01

## 2022-11-16 RX ADMIN — NICOTINE 1 PATCH: 14 PATCH, EXTENDED RELEASE TRANSDERMAL at 10:01

## 2022-11-16 NOTE — ASSESSMENT & PLAN NOTE
Presents with complaints of substernal chest pain, epigastric discomfort and SOB after eating at [facility] prior to falling asleep  SOB resolved and reports faint chest pain/epigastric pain currently  Unsure if patient had retained/swallowed chicken bone or food  · No prior cardiac hx  JORGE 0   · Suspect to be 2/2 GERD vs cocaine use  Reproducible chest pain/epigastric pain now resolved  · CXR: NAD  · CTA chest: negative for PE, acute pathology  · D-Dimer: 2 35  · HS troponin: 9->24->34  ECG: sinus tachycardia 114, nonspecific ST-T wave abnormality  · A1c: 6 0  Lipid panel: overall wnl, HDL decreased 33  · Cardiology consulted:  · No contraindications to EGD as scheduled, no signs of CHF or ACS  · ECHO:  EF 75-66%, normal systolic and diastolic function  · Repeat 11/15 - HS troponin: 4->5  EKG: NSR with no signs of ACS     · Will give referral to establish care with PCP on discharge

## 2022-11-16 NOTE — ASSESSMENT & PLAN NOTE
· Epigastric/throat pain with swallowing as above  · CT soft tissue neck: Patent visualized airways, no definitive acute findings in  No radiopaque foreign body in aerodigestive tract total past patient right maxillary sinus with expansion extension into right ethmoidal air cells  Underlying polypoid disease not excluded, recommend follow-up with ENT  · EGD 11/15: Antritis with no significant esophagitis or foreign body    · GI following:  · C/w PPI and start Carafate   · Follow-up with ENT and GI outpatient, will give referral  · Speech following, continue with full liquid diet and advance as tolerated

## 2022-11-16 NOTE — DISCHARGE SUMMARY
[address] [facility] 1975, 41 y o  male MRN: [id number]  Unit/Bed#: -01 Encounter: [id number]  Primary Care Provider: No primary care provider on file  Date and time admitted to hospital: 11/14/2022  2:29 AM    * Chest pain  Assessment & Plan  Presents with complaints of substernal chest pain, epigastric discomfort and SOB after eating at [facility] prior to falling asleep  SOB resolved and reports faint chest pain/epigastric pain currently  Unsure if patient had retained/swallowed chicken bone or food  · No prior cardiac hx  JORGE 0   · Suspect to be 2/2 GERD vs cocaine use  Reproducible chest pain/epigastric pain now resolved  · CXR: NAD  · CTA chest: negative for PE, acute pathology  · D-Dimer: 2 35  · HS troponin: 9->24->34  ECG: sinus tachycardia 114, nonspecific ST-T wave abnormality  · A1c: 6 0  Lipid panel: overall wnl, HDL decreased 33  · Cardiology consulted:  · No contraindications to EGD as scheduled, no signs of CHF or ACS  · ECHO:  EF 43-43%, normal systolic and diastolic function  · Repeat 11/15 - HS troponin: 4->5  EKG: NSR with no signs of ACS  · Will give referral to establish care with PCP on discharge    Dysphagia  Assessment & Plan  · Epigastric/throat pain with swallowing as above  · CT soft tissue neck: Patent visualized airways, no definitive acute findings in  No radiopaque foreign body in aerodigestive tract total past patient right maxillary sinus with expansion extension into right ethmoidal air cells  Underlying polypoid disease not excluded, recommend follow-up with ENT  · EGD 11/15: Antritis with no significant esophagitis or foreign body  · GI following:  · C/w PPI and start Carafate   · Follow-up with ENT and GI outpatient, will give referral  · Speech following, continue with full liquid diet and advance as tolerated    Sinus tachycardia  Assessment & Plan  · POA with -120s, possibly related to epinephrine use  Resolved  · Improved with bolus, continue IVF  · CTA negative for PE   · TSH: 6 712, T4: wnl  Re-check TSH: 0 411, T4: wnl  Suspect subclinical thyroiditis with acute illness  · Recheck repeat TSH/T4 in 4-6 weeks  · Telemetry reviewed, NSR with few PVCs, otherwise no acute events overnight    Abnormal result on screening urine test  Assessment & Plan  · UDS: + for cocaine  Urine toxicology screen: Pending  · Discussed risks of cardiac damage with cocaine use, patient expressed understanding  · Encourage drug cessation    Allergic reaction  Assessment & Plan  · Reports pruritic rash  No angioedema, no difficulty breathing  SOB resolved  · Faint maculopapular rash on trunk, since resolved  · S/p Benadryl, Epi and Solu-medrol in EMS  · Supportive care     Medical Problems     Resolved Problems  Date Reviewed: 11/16/2022   None       Discharging Physician / Practitioner: Broderick Chawla  PCP: No primary care provider on file  Admission Date:   Admission Orders (From admission, onward)     Ordered        11/15/22 1236  Inpatient Admission  Once            11/14/22 0556  Place in Observation  Once                      Discharge Date: 11/16/22    Consultations During Hospital Stay:  · GI  · Cardiology  · Speech    Procedures Performed:   · EGD 11/15: IMPRESSION:  Antritis no significant esophagitis or foreign body  Significant Findings / Test Results:   CT abdomen pelvis wo contrast   Final Result by Melissa Kahn MD (11/15 1200)      No acute inflammatory stranding   No bowel obstruction   No peripancreatic fluid collection         Workstation performed: [id number]         CT soft tissue neck wo contrast   Final Result by Jeff Matos MD (11/14 1247)      1  Exam is compromised by lack of IV contrast   No definite acute findings within the neck  Patent visualized airways  2   No radiopaque foreign body in the visualized aerodigestive tract          3   Total opacification of the right maxillary sinus with antral expansion and extension into right ethmoidal air cells  Underlying polypoid disease is not excluded  Recommend follow-up with ENT service  Workstation performed: [id number]         CTA ED chest PE Study   Final Result by Sudhir Amaro MD (11/14 0517)      No acute pathology  No pulmonary embolus  Workstation performed: [id number]         XR chest 1 view portable   ED Interpretation by Daksha Tinsley MD (11/14 0315)   No infiltrate or pneumothorax      Final Result by Sudhir Amaro MD (11/14 0517)      No acute cardiopulmonary disease  Workstation performed: [id number]             Incidental Findings:   · As above on CT soft tissue neck, follow-up with ENT if symptoms recur or worsen     Test Results Pending at Discharge (will require follow up): · Toxicology urine screen     Outpatient Tests Requested:  · Establish care with PCP, given referral  · Obtain TSH/T4 in 4-6 weeks  · Given referral to follow-up with ENT and GI outpatient if symptoms recur/worsen  · Given referral to follow-up with allergist due to allergic reaction in ED    Complications:  None    Reason for Admission:  Chest pain    Hospital Course:   Anders Parekh is a 41 y o  male patient with no PMH, who originally presented to the hospital on 11/14/2022 due to substernal chest/epigastric pain  Patient reports symptoms started after eating at [facility] prior to falling asleep, reports that pain occurs with swallowing food/drinks  EN route per EMS, patient appeared to have an allergic reaction with maculopapular rash, received Benadryl, epinephrine and IV Solu-Medrol  On arrival to ED, patient with sinus tachycardia, troponins trended up to 34, EKG with nonspecific ST T-wave abnormality  D-dimer was elevated, CTA chest negative for PE, CXR without acute findings  CT soft tissue of neck did not reveal any acute findings concerning for foreign body or obstruction to airway   Patient placed on telemetry for monitoring, GI and speech consulted  During admission, UDS was positive for cocaine, urine toxicology screen pending on discharge  EGD did not reveal any significant esophagitis or foreign body, only with mild antritis  ECHO did not reveal any acute findings, Cardiology evaluated and determined symptoms were not consistent with ACS  Initial TSH was elevated, recheck was decreased with normal T4, suspect subclinical thyroiditis with acute illness  Patient's tachycardia and chest/epigastric pain had resolved, suspect symptoms to be in setting of GERD verses cocaine use  Patient can continue with daily PPI and start Carafate, will give referral to follow-up with GI and ENT outpatient if symptoms recur or worsen  Patient will need to establish care with PCP, given internal medicine referral   Recommend obtaining repeat TSH/T4 in 4-6 weeks, educated on cardiac risks with cocaine/stimulant use  Please see above list of diagnoses and related plan for additional information  Condition at Discharge: stable    Discharge Day Visit / Exam:   Subjective:  Patient seen at bedside this a m ,  utilized for translation  Patient reports he no longer has chest or epigastric pain, reports improvement in swallowing, states he feels ready for discharge home  Vitals: Blood Pressure: 128/80 (11/16/22 0805)  Pulse: 74 (11/16/22 0805)  Temperature: 97 8 °F (36 6 °C) (11/16/22 0500)  Temp Source: Tympanic (11/16/22 0500)  Respirations: 17 (11/16/22 0500)  Height: 5' 6" (167 6 cm) (11/16/22 0805)  Weight - Scale: 93 9 kg (207 lb) (11/16/22 0805)  SpO2: 97 % (11/16/22 0500)  Exam:   Physical Exam  Constitutional:       General: He is not in acute distress  Appearance: He is not ill-appearing, toxic-appearing or diaphoretic  Cardiovascular:      Rate and Rhythm: Normal rate and regular rhythm  Pulses: Normal pulses  Heart sounds: Normal heart sounds     Pulmonary:      Effort: Pulmonary effort is normal  No respiratory distress  Breath sounds: Normal breath sounds  Abdominal:      General: Bowel sounds are normal  There is no distension  Palpations: Abdomen is soft  Tenderness: There is no abdominal tenderness  Musculoskeletal:         General: No swelling or tenderness  Skin:     General: Skin is warm  Neurological:      General: No focal deficit present  Mental Status: He is alert  Psychiatric:         Mood and Affect: Mood normal          Behavior: Behavior normal          Discussion with Family: Patient declined call to   Discharge instructions/Information to patient and family:   See after visit summary for information provided to patient and family  Provisions for Follow-Up Care:  See after visit summary for information related to follow-up care and any pertinent home health orders  Disposition:   Home    Planned Readmission:  None     Discharge Statement:  I spent 45 minutes discharging the patient  This time was spent on the day of discharge  I had direct contact with the patient on the day of discharge  Greater than 50% of the total time was spent examining patient, answering all patient questions, arranging and discussing plan of care with patient as well as directly providing post-discharge instructions  Additional time then spent on discharge activities  Discharge Medications:  See after visit summary for reconciled discharge medications provided to patient and/or family        **Please Note: This note may have been constructed using a voice recognition system**

## 2022-11-16 NOTE — DISCHARGE INSTR - AVS FIRST PAGE
Usted fue tratado y evaluado por dolor torácico y abdominal superior  Lo más probable es que esto se deba a aimee irritación leve en el esófago, reflujo ácido o consumo de cocaína  La gastroenterología fue la siguiente mientras estaba hospitalizado, se le realizó aimee endoscopia que solo mostró irritación leve en el esófago  Recomendamos continuar tomando Protonix y Carafate para ayudar con los síntomas  Cardiología también lo evaluó mientras estaba hospitalizado, el estudio no mostró ningún signo de anomalías cardíacas o daño cardíaco   Le hicieron un ecocardiograma o aimee ecografía del corazón, deberá hacer un seguimiento ambulatorio con un proveedor de atención primaria [employer]  Patience Cassius [employer] derivación para el seguimiento con el proveedor de [employer]  También le dará referencia para un especialista gastrointestinal y un especialista en otorrinolaringología si sherri síntomas se repiten o Britney Grain  Evite el uso de cocaína u otros estimulantes, ya que esto puede conducir a problemas cardíacos significativos o incluso ataque cardíaco   Deberá repetir el análisis de yvon para evaluar sherri niveles de tiroides en 4-6 semanas  También le dará [employer] referencia para hacer un seguimiento con un especialista en alergias souleymane mace reacción alérgica mientras está en el departamento de emergencias

## 2022-11-16 NOTE — ASSESSMENT & PLAN NOTE
· POA with -120s, possibly related to epinephrine use  Resolved  · Improved with bolus, continue IVF  · CTA negative for PE   · TSH: 6 712, T4: wnl  Re-check TSH: 0 411, T4: wnl  Suspect subclinical thyroiditis with acute illness    · Recheck repeat TSH/T4 in 4-6 weeks  · Telemetry reviewed, NSR with few PVCs, otherwise no acute events overnight

## 2022-11-16 NOTE — NURSING NOTE
This RN spoke to patient using  line in the [place] language  Discharge instructions were reviewed  All of patients questions were answered  RN told patient through  that he must call this RN using call button when patients ride is here to pick him up  Patient given call button and shown what to press  Patient verbalized understanding  This RN went to check on another patient  When this RN came back in the lobo, the patients room was empty   stated that she saw patient going into the elevator alone a few minutes ago

## 2022-11-16 NOTE — ASSESSMENT & PLAN NOTE
· UDS: + for cocaine   Urine toxicology screen: Pending  · Discussed risks of cardiac damage with cocaine use, patient expressed understanding  · Encourage drug cessation

## 2022-11-28 NOTE — UTILIZATION REVIEW
URGENT/EMERGENT  INPATIENT/SPU AUTHORIZATION REQUEST    Date: 11/28/22            # Pages in this Request:     X New Request   Additional Information for PA#:     Office Contact Name:  Jennifer Gloden Title: Utilization Review, Regrobbie Nurse     Phone: [phone]  Ext  Availability (Date/Time): Wednesday - Friday 8 am- 4 pm    x Inpatient Review  SPU Review        Current       x Late Pick-up   · How your facility was first notified of the Late Pick-up: Paths Letter   · When your facility was first notified of the Late Pick-up (date): 11/22/2022         RECIPIENT INFORMATION    Recipient ID#: [id number]   Recipient Name: Zayra Brown      YOB: 1975  41 y o  Recipient Alias:     Gender:  X Male  Female Medicaid Eligibility ([address] [place] [address]): INSURANCE INFORMATION    (All other private or governmental health insurance benefits must be utilized prior to billing the [place] Program)    Was this admission the result of an MVA, other accident, assault, injury, fall, gunshot, bite etc ? Yes X No                   If yes, provide a brief description of the incident  Does the recipient have other insurance coverage? Yes x No        Insurance Company Name/Policy #      Did that insurance pay on this claim? Yes  No        Did that insurance deny this claim? Yes  No    If yes, reason for denial:      Does the recipient have Medicare? Yes x No        Did Medicare exhaust prior to this admission? Yes  No        Did Medicare partially pay this claim? Yes  No        Did that insurance deny this claim? Yes  No    If yes, reason for denial:          Was the recipient a prisoner at the time of admission?   Yes x No            PROVIDER INFORMATION    Hospital Name: [address] [place] [address] Provider ID#: [id number]    [address] [place] [address] Physician Name: Jelani Faulkner Provider ID#: [id number]        ADMISSION INFORMATION    Type of Admission: (please choose one)    X ED      Direct    If yes, from where?     Transfer    If yes, transferring hospital (inpatient, rehab, or psych) Provider Name/Provider ID#: Admission Floor or Unit Type: Med Surg     Dates/Times:        ED Date/Time: 11/14/2022  2:29 AM        Observation Date/Time:  11/14/2022  05:56        Admission Date/Time: 11/15/22 12:36 PM        Discharge or Transfer Date/Time: 11/16/2022 11:00 AM        DIAGNOSIS/PROCEDURE CODES    Primary Diagnosis Code/Primary Diagnosis Code description:  K21 9 Gastro-esophageal reflux disease without esophagitis    R07 2 Precordial pain   R06 02 Shortness of breath   R13 10 Dysphagia, unspecified    Additional Diagnosis Code(s) and Description(s)-(up to three additional codes):    Procedure Code (one) and description:  [id number] Excision of Stomach, Endo, Diagn          CLINICAL INFORMATION - PRIOR ADMISSION ONLY    Is there a prior admission with a discharge date within 30 days of the date of this admission? X No (Proceed to the next section - "Clinical Information - General Review Checklist:)      Yes (Provide the following information)     Prior admission dates:    MA Prior Authorization Number:        Review Outcome:     Diagnosis Code(s)/Description:    Procedure Code/Description:    Findings:    Treatment:    Condition on Discharge:   Vitals:    Labs:   Imaging:   Medications: Follow-up Instructions:    Disposition:        CLINICAL INFORMATION - GENERAL REVIEW CHECKLIST    EMERGENCY DEPARTMENT: (Proceed to "ADMISSION" if Direct Admission)    Presenting Signs/Symptoms: 41 y o  male, presented to the ED @ [address], from home via EMS  Admitted as Observation due to Chest Pain  11/14/2022     Presents with concerns of chest pain, shortness of breath and possible allergic reaction starting this evening   Reports substernal chest pain and epigastric pain starting tonight after eating [employer] prior to falling asleep   Noted some shortness of breath, now resolved   No association with positions, respirations  [facility] denies any prior cardiac history or cardiac testing  JORGE 0   CXR: NAD  CTA negative for PE   UDS pending  Trend hs trop and ECG  Hs trop: 9>24  ECG: sinus tachycardia 114, nonspecific ST-T wave abnormality  Monitor on tele    Check A1c and lipid panel        Medication/treatment prior to arrival in the ED:    Past Medical History:    Clinical Exam:    Initial Vital Signs: (Temp, Pulse, Resp, and BP)   ED Triage Vitals   Temperature Pulse Respirations Blood Pressure SpO2   11/14/22 0242 11/14/22 0232 11/14/22 0232 11/14/22 0232 11/14/22 0232   97 7 °F (36 5 °C) (!) 124 (!) 26 129/79 100 %      Temp Source Heart Rate Source Patient Position - Orthostatic VS BP Location FiO2 (%)   11/14/22 0242 11/14/22 0232 11/14/22 0232 11/14/22 0232 --   Oral Monitor Lying Right arm       Pain Score       11/14/22 0748       4           Pertinent Repeat Vital Signs: (include times they were obtained)  Date/Time Temp Pulse Resp BP MAP (mmHg) SpO2 Calculated FIO2 (%) - Nasal Cannula Nasal Cannula O2 Flow Rate (L/min) O2 Device Patient Position - Orthostatic VS   11/15/22 0749 98 °F (36 7 °C) 79 20 116/65 -- 98 % -- -- -- Lying   11/15/22 0338 97 4 °F (36 3 °C) Abnormal  97 16 106/69 86 95 % -- -- None (Room air) Sitting   11/15/22 0100 97 8 °F (36 6 °C) 96 16 118/80 -- -- -- -- None (Room air) Sitting   11/14/22 2100 -- -- -- -- -- -- -- -- None (Room air) --   11/14/22 2007 96 2 °F (35 7 °C) Abnormal  98 16 116/79 87 95 % -- -- -- Lying   11/14/22 1549 97 6 °F (36 4 °C) 91 16 127/79 97 94 % -- -- -- Lying   11/14/22 1218 97 4 °F (36 3 °C) Abnormal  93 16 134/81 104 95 % -- -- None (Room air) Sitting   11/14/22 0645 97 6 °F (36 4 °C) 63 17 124/75 93 96 % 24 1 L/min Nasal cannula Lying   11/14/22 0552 -- 96 16 113/73 -- 98 % 24 1 L/min Nasal cannula Lying   11/14/22 0352 -- 108 Abnormal  16 128/69 -- 94 % 28 2 L/min Nasal cannula Sitting   11/14/22 0303 -- 112 Abnormal  18 119/71 -- 96 % -- -- None (Room air) Sitting 11/14/22 0242 97 7 °F (36 5 °C) 114 Abnormal  19 -- -- -- -- -- -- --       Pertinent Sustained Findings: (include times they were obtained)    Weight in Kilograms:    11/15/22 93 9 kg (207 lb)            Wt Readings from Last 1 Encounters:   11/16/22 93 9 kg (207 lb)       Pertinent Labs (results):  Results from last 7 days   Lab Units 11/15/22  0507 11/14/22  0254   WBC Thousand/uL 12 51* 7 54   HEMOGLOBIN g/dL 13 3 14 4   HEMATOCRIT % 39 0 41 9   PLATELETS Thousands/uL 278 341   NEUTROS ABS Thousands/µL  --  4 70            Results from last 7 days   Lab Units 11/15/22  0507 11/14/22  0254   SODIUM mmol/L 137 136   POTASSIUM mmol/L 3 9 3 6   CHLORIDE mmol/L 103 101   CO2 mmol/L 27 23   ANION GAP mmol/L 7 12   BUN mg/dL 13 16   CREATININE mg/dL 0 62 0 94   EGFR ml/min/1 73sq m 119 97   CALCIUM mg/dL 9 4 9 4   MAGNESIUM mg/dL  --  1 9           Results from last 7 days   Lab Units 11/14/22  0254   AST U/L 18   ALT U/L 28   ALK PHOS U/L 84   TOTAL PROTEIN g/dL 6 9   ALBUMIN g/dL 3 8   TOTAL BILIRUBIN mg/dL 0 53            Results from last 7 days   Lab Units 11/15/22  0507 11/14/22  0254   GLUCOSE RANDOM mg/dL 154* 230*           Results from last 7 days   Lab Units 11/14/22  0254   HEMOGLOBIN A1C % 6 0*   EAG mg/dl 126               Results from last 7 days   Lab Units 11/15/22  1248 11/15/22  1038 11/14/22  0737 11/14/22  0428 11/14/22  0254   HS TNI 0HR ng/L  --  4  --   --  9   HS TNI 2HR ng/L 5  --   --  24  --    HSTNI D2 ng/L 1  --   --  15  --    HS TNI 4HR ng/L  --   --  34  --   --    HSTNI D4 ng/L  --   --  25*  --   --            Results from last 7 days   Lab Units 11/14/22  0254   D-DIMER QUANTITATIVE ug/ml FEU 2 35*            Results from last 7 days   Lab Units 11/15/22  0507 11/14/22  0254   TSH 3RD GENERATON uIU/mL 0 411* 6 712*           Results from last 7 days   Lab Units 11/14/22  0254   BNP pg/mL 6           Results from last 7 days   Lab Units 11/14/22  0254   LIPASE u/L 33     Results from last 7 days   Lab Units 11/14/22  1609   AMPH/METH   Negative   BARBITURATE UR   Negative   BENZODIAZEPINE UR   Negative   COCAINE UR   Positive*   METHADONE URINE   Negative   OPIATE UR   Negative   PCP UR   Negative   THC UR   Negative      Results from last 7 days   Lab Units 11/14/22  0254   ETHANOL LVL mg/dL <10     Radiology (results):  CT soft tissue neck wo contrast   Final Result by Caleb Brewer MD (11/14 4677)       1  Exam is compromised by lack of IV contrast   No definite acute findings within the neck  Patent visualized airways        2  No radiopaque foreign body in the visualized aerodigestive tract          3  Total opacification of the right maxillary sinus with antral expansion and extension into right ethmoidal air cells  Underlying polypoid disease is not excluded  Recommend follow-up with ENT service        CTA ED chest PE Study   Final Result by Todd Arcos MD (11/14 0517)       No acute pathology  No pulmonary embolus        XR chest 1 view portable   ED Interpretation by Halima Hay MD (11/14 0315)   No infiltrate or pneumothorax       Final Result by Todd Arcos MD (11/14 0517)       No acute cardiopulmonary disease  EKG (results):      Other tests (results):    Tests pending final results:    Treatment in the ED:   Medication Administration from 11/14/2022 0228 to 11/14/2022 0640       Date/Time Order Dose Route Action Comments     11/14/2022 0252 EST sodium chloride 0 9 % bolus 1,000 mL 1,000 mL Intravenous New Bag --     11/14/2022 0301 EST LORazepam (ATIVAN) injection 1 mg 1 mg Intravenous Given --     11/14/2022 0511 EST iohexol (OMNIPAQUE) 350 MG/ML injection (SINGLE-DOSE) 100 mL 100 mL Intravenous Given --     11/14/2022 0553 EST aspirin chewable tablet 162 mg 162 mg Oral Given --           Other treatments:      Change in condition while in the ED:     Response to ED Treatment:          OBSERVATION: (Proceed to "ADMISSION" if Direct Admission)    Orders written during the observation period    NPO  > EGD  Activity as tolerated  Up & OOB as tolerated  Consult ST  Telemetry        Meds Name, dose, route, time, how may doses given:    insulin lispro, 1-5 Units, Subcutaneous, TID AC  nicotine, 1 patch, Transdermal, Daily  pantoprazole, 40 mg, Intravenous, Q24H [address]       PRN Meds Name, dose, route, time, how many doses given within the first 24 hrs :  IVs Type, rate, and total amt  Ordered/given:    lactated ringers, 100 mL/hr, Intravenous, Continuous      Labs, imaging, other:  Consults and findings:  11/14/2022  Consult GI:  Chest pain, dysphagia w/ odynophagia   This is a 44-year-old male who presented via EMS due to substernal chest pain, shortness of breath and rash which began after eating [employer] chicken around 1am  He was given epinephrine, Solu-Medrol, and Benadryl PTA by EMS due to concern for food allergy  UDS +cocaine  Cardiac evaluation with EKG and troponins  Unremarkable  CXR w/ no evidence of cardiopulmonary disease  He had CTA PE study due to elevated d-dimer which was negative for PE  Reports now that he continues w/ sore throat, chest pain, and inability to tolerate anything besides liquids  Speech evaluated pt and he had no s/s aspiration w/ liquids, yogurt but declined solids except for one small bite of cookie  Sore throat w/ sensation of something in his throat & epigastric pain may be secondary to a scratch, partial food impaction, EOE, food allergy  He appears to be tolerating secretions well and tolerates liquids, but refuses any solids  Chest pain/epigastric pain reproducible w/ palpation     -Keep NPO except sips clears  -Continue IV PPI  -Continue supportive care  -Will schedule EGD  Prep and procedure explained       Cardiology Consult - 11/15/22 -  42 yo m presented with chest pain  He reported a pressure like sensation in the center of his chest( occurred after eating fried chicken and laying down )   He had a maculopapular rash on presentation  EMS gave benadryl, solumedrol and epinephrine for possible allergic reaction  GI eval with plan for EGD for dysphagia and odynophagia  Cardiology consulted for chest pain  He has no significant PMH  All ECG's reviewed and no significant ST/T changes  High sensitivity troponin 9,24,34  Exam with reproducible pain over the lower sternum and epigastrium  No S/s of HCF, no ACS  Check ECHO  Given c/o MONTALVO and cocaine abuse  Test Results during the observation period  Pertinent Lab tests (dates/results):  Culture results (blood, urine, spinal, wound, respiratory, etc ):  Imaging tests (dates/results):  EKG (dates/results): Other test (dates/results):  Tests pending (dates/results):    Surgical or Invasive Procedures during the observation period  Name of surgery/procedure:  Date & Time:  Patient Response:  Post-operative orders:  Operative Report/Findings:    Response to Treatment, Major Change in Condition, Major Charge in Treatment during the observation period          ADMISSION:    DIRECT Admissions Only:    · Presenting Signs/Symptoms:   ·   · Medication/treatment prior to arrival:  ·   · Past Medical History:  ·   · Clinical Exam on admission:  ·   · Vital Signs on admission: (Temp, Pulse, Resp, and BP)  ·   · Weight in kilograms:     ALL Admissions:    Admission Orders and Other Orders written within the first 24 hrs after admission    NPO  > EGD  Activity as tolerated  Up & OOB as tolerated  Consult ST  Telemetry      Meds Name, dose, route, time, how may doses given:    insulin lispro, 1-5 Units, Subcutaneous, TID AC  nicotine, 1 patch, Transdermal, Daily  pantoprazole, 40 mg, Intravenous, Q24H [address]         PRN Meds Name, dose, route, time, how many doses given within the first 24 hrs :    acetaminophen, 650 mg, Oral, Q6H PRN  aluminum-magnesium hydroxide-simethicone, 30 mL, Oral, Q4H PRN      IVs Type, rate, and total amt   Ordered/given:    lactated ringers, 100 mL/hr, Intravenous, Continuous      Labs, imaging, other:      Consults and findings:    Test Results after admission  Pertinent Lab tests (dates/results):  Culture results (blood, urine, spinal, wound, respiratory, etc ):  Imaging tests (dates/results):  EKG (dates/results): Other test (dates/results):  Tests pending (dates/results):    Surgical or Invasive Procedures  Name of surgery/procedure: EGD   Date & Time: 11/15/2022  Patient Response: Tolerated   Post-operative orders: Same   Operative Report/Findings: - Impression:  Antritis no significant esophagitis or foreign body   RECOMMENDATION: Clear liquid diet room temperature, continue with Protonix add Carafate  Suspension  Response to Treatment, Major Change in Condition, Major Charge in Treatment anytime during admission  Hospital Course:   Agnes Cortes is a 41 y o  male patient with no PMH, who originally presented to the hospital on 11/14/2022 due to substernal chest/epigastric pain  Patient reports symptoms started after eating at [facility] prior to falling asleep, reports that pain occurs with swallowing food/drinks  EN route per EMS, patient appeared to have an allergic reaction with maculopapular rash, received Benadryl, epinephrine and IV Solu-Medrol  On arrival to ED, patient with sinus tachycardia, troponins trended up to 34, EKG with nonspecific ST T-wave abnormality  D-dimer was elevated, CTA chest negative for PE, CXR without acute findings  CT soft tissue of neck did not reveal any acute findings concerning for foreign body or obstruction to airway  Patient placed on telemetry for monitoring, GI and speech consulted      During admission, UDS was positive for cocaine, urine toxicology screen pending on discharge  EGD did not reveal any significant esophagitis or foreign body, only with mild antritis  ECHO did not reveal any acute findings, Cardiology evaluated and determined symptoms were not consistent with ACS   Initial TSH was elevated, recheck was decreased with normal T4, suspect subclinical thyroiditis with acute illness       Patient's tachycardia and chest/epigastric pain had resolved, suspect symptoms to be in setting of GERD verses cocaine use  Patient can continue with daily PPI and start Carafate, will give referral to follow-up with GI and ENT outpatient if symptoms recur or worsen  Patient will need to establish care with PCP, given internal medicine referral   Recommend obtaining repeat TSH/T4 in 4-6 weeks, educated on cardiac risks with cocaine/stimulant use  Disposition on Discharge  Home, Rehab, SNF, LTC, Shelter, etc : Home/Self Care    Cease to Breathe (CTB)  If a patient expires during an admission, in addition to the above information, please include:    Summary/timeline of the patient's decline in condition:    Medications and treatment:    Patient response to treatment:    Date and time patient ceased to breathe:        Is there a Readmission that follows this admission? Yes x No    If yes, provide dates:          InterQual Review    InterQual Criteria Met:  Yes X No  N/A        Please include the InterQual Review, InterQual year/version used, and the criteria selected:   Created Using Review Status Review Entered   InterQual Connect™ In Primary 11/28/2022 1406       Created By   Della Lira RN       Criteria Set Name - Subset   LOC:Acute Adult-General Medical      Criteria Review   REVIEW SUMMARY     InterQual® Review Status: In Primary  Criteria Status: Not Met  Condition Specific: Yes        REVIEW DETAILS     Product: Oliver Re Adult  Subset: General Medical           Version: [employer]® 2022, Oct  2022 Release  InterQual® criteria (IQ) is confidential and proprietary information and is being provided to you solely as it pertains to the information requested  IQ may contain advanced clinical knowledge which we recommend you discuss with your physician upon disclosure to you   Use permitted by and subject to license with [employer] and/or one of its [place]  IQ reflects clinical interpretations and analyses and cannot alone either (a) resolve medical ambiguities of particular situations; or (b) provide the sole basis for definitive decisions  IQ is intended solely for use as screening guidelines with respect to medical appropriateness of healthcare services  All ultimate care decisions are strictly and solely the obligation and responsibility of your health care provider  © 2022 [employer] and/or one of its subsidiaries  All Rights Reserved  CPT® only © 4687-5927 [employer]  All Rights Reserved  PLEASE SUBMIT THE COMPLETED FORM TO THE DEPARTMENT OF HUMAN SERVICES - DIVISION OF CLINICAL  REVIEW VIA FAX -[phone] or VIA E-MAIL TO [email or web address]    Signature: Tevin Marquez Date:  11/28/22    Confidentiality Notice: The documents accompanying this telecopy may contain confidential information belonging to the sender  The information is intended only for the use of the individual named above  If you are not the intended recipient, you are hereby notified  That any disclosure, copying, distribution or taking of any telecopy is strictly prohibited

## 2022-11-29 LAB
AMPHETAMINES UR QL SCN: NEGATIVE NG/ML
BARBITURATES UR QL SCN: NEGATIVE NG/ML
BENZODIAZ UR QL: NEGATIVE NG/ML
BZE UR QL: POSITIVE
CANNABINOIDS UR QL SCN: NEGATIVE NG/ML
METHADONE UR QL SCN: NEGATIVE NG/ML
OPIATES UR QL: NEGATIVE NG/ML
PCP UR QL: NEGATIVE NG/ML
PROPOXYPH UR QL SCN: NEGATIVE NG/ML

## 2022-12-07 ENCOUNTER — TELEPHONE (OUTPATIENT)
Dept: GASTROENTEROLOGY | Facility: CLINIC | Age: 47
End: 2022-12-07

## 2022-12-07 NOTE — TELEPHONE ENCOUNTER
Incoming call from call center, pt returning phone call for biopsy results  from Dr Pamela Perez - Please inform patient that their biopsies were benign  Relayed provider message  No further questions from patient

## 2023-07-11 ENCOUNTER — HOSPITAL ENCOUNTER (EMERGENCY)
Facility: HOSPITAL | Age: 48
Discharge: HOME | End: 2023-07-11
Attending: EMERGENCY MEDICINE
Payer: COMMERCIAL

## 2023-07-11 VITALS
HEART RATE: 73 BPM | SYSTOLIC BLOOD PRESSURE: 149 MMHG | RESPIRATION RATE: 18 BRPM | TEMPERATURE: 98 F | DIASTOLIC BLOOD PRESSURE: 88 MMHG | OXYGEN SATURATION: 98 %

## 2023-07-11 DIAGNOSIS — L40.9 PSORIASIS: Primary | ICD-10-CM

## 2023-07-11 DIAGNOSIS — L72.3 SEBACEOUS CYST: ICD-10-CM

## 2023-07-11 PROCEDURE — 99283 EMERGENCY DEPT VISIT LOW MDM: CPT | Performed by: EMERGENCY MEDICINE

## 2023-07-11 PROCEDURE — 99283 EMERGENCY DEPT VISIT LOW MDM: CPT

## 2023-07-11 NOTE — ED PROVIDER NOTES
History  Chief Complaint   Patient presents with   • Testicle Pain     Patient presents to ED c/o a growth that he noticed on his testicles yesterday. No meds      Reports painless lump in his scrotum that he just noticed today. The lump is separate from his testicle. He notes that on the left side. He was concerned it may be an abscess. He denies any fevers or chills. It has not been increasing in size. No drainage. No similar problems in the past.    In addition to this, patient reports more than 1 year history of a rash that is itchy all over his body. He was previously prescribed "pills "for this but he cannot take pills and never took them. He has never been given a clear diagnosis about what is causing his rash. His rash has not changed over the past year. It does not occur in his mouth. Testicle Pain      Prior to Admission Medications   Prescriptions Last Dose Informant Patient Reported? Taking?   acetaminophen (TYLENOL) 325 mg tablet Not Taking  No No   Sig: Take 2 tablets (650 mg total) by mouth every 6 (six) hours as needed for mild pain   Patient not taking: Reported on 7/11/2023   pantoprazole (PROTONIX) 40 mg tablet   No No   Sig: Take 1 tablet (40 mg total) by mouth daily   sucralfate (CARAFATE) 1 g/10 mL suspension   No No   Sig: Take 10 mL (1 g total) by mouth 4 (four) times a day for 14 days      Facility-Administered Medications: None       History reviewed. No pertinent past medical history. History reviewed. No pertinent surgical history. History reviewed. No pertinent family history. I have reviewed and agree with the history as documented.     E-Cigarette/Vaping   • E-Cigarette Use Never User      E-Cigarette/Vaping Substances   • Nicotine No    • THC No    • CBD No    • Flavoring No    • Other No    • Unknown No      Social History     Tobacco Use   • Smoking status: Every Day     Packs/day: 1.00     Years: 23.00     Total pack years: 23.00     Types: Cigarettes   • Smokeless tobacco: Never   Vaping Use   • Vaping Use: Never used   Substance Use Topics   • Alcohol use: Not Currently     Comment: socially   • Drug use: No       Review of Systems   Genitourinary: Positive for testicular pain. All other systems reviewed and are negative. Physical Exam  Physical Exam  Vitals and nursing note reviewed. HENT:      Head: Normocephalic. Mouth/Throat:      Mouth: Mucous membranes are moist.   Eyes:      Conjunctiva/sclera: Conjunctivae normal.   Cardiovascular:      Pulses: Normal pulses. Pulmonary:      Effort: Pulmonary effort is normal.   Genitourinary:     Testes: Normal.      Comments: No testicular mass or tenderness. Separate 5 mm area of scrotum that is indurated and rubbery with very mild erythema and no tenderness  Musculoskeletal:      Cervical back: Normal range of motion. Skin:     General: Skin is warm. Comments: Generalized rash of her torso and extremities but sparing face consisting of dusky red base with silver top. Rash is circular usually around 5 to 10 mm but confluent in areas. Neurological:      General: No focal deficit present. Mental Status: He is alert. Psychiatric:         Mood and Affect: Mood normal.         Vital Signs  ED Triage Vitals [07/11/23 0603]   Temperature Pulse Respirations Blood Pressure SpO2   98 °F (36.7 °C) 73 18 149/88 98 %      Temp Source Heart Rate Source Patient Position - Orthostatic VS BP Location FiO2 (%)   Oral Monitor Lying Left arm --      Pain Score       --           Vitals:    07/11/23 0603   BP: 149/88   Pulse: 73   Patient Position - Orthostatic VS: Lying         Visual Acuity      ED Medications  Medications - No data to display    Diagnostic Studies  Results Reviewed     None                 No orders to display              Procedures  Procedures         ED Course                               SBIRT 22yo+    Flowsheet Row Most Recent Value   Initial Alcohol Screen: US AUDIT-C     1.  How often do you have a drink containing alcohol? 0 Filed at: 07/11/2023 0602   2. How many drinks containing alcohol do you have on a typical day you are drinking? 0 Filed at: 07/11/2023 0602   3a. Male UNDER 65: How often do you have five or more drinks on one occasion? 0 Filed at: 07/11/2023 0602   3b. FEMALE Any Age, or MALE 65+: How often do you have 4 or more drinks on one occassion? 0 Filed at: 07/11/2023 0602   Audit-C Score 0 Filed at: 07/11/2023 0602   FANTASMA: How many times in the past year have you. .. Used an illegal drug or used a prescription medication for non-medical reasons? Never Filed at: 07/11/2023 0602                    Medical Decision Making  Testicular exam performed without any masses, tenderness, irregularities. Very small nodule in scrotum consistent with sebaceous cyst.    I discussed need for dermatology follow-up for generalized rash as well as cyst.  Patient verbalized understanding and agreed. I discussed red flags of prompt return to emergency department including pain or increase in size of the cyst.        Disposition  Final diagnoses:   Psoriasis   Sebaceous cyst     Time reflects when diagnosis was documented in both MDM as applicable and the Disposition within this note     Time User Action Codes Description Comment    7/11/2023  6:26 AM Demetrius Alcazar Add [L40.9] Psoriasis     7/11/2023  6:26 AM Demetrius Alcazar Add [L72.3] Sebaceous cyst       ED Disposition     ED Disposition   Discharge    Condition   Stable    Date/Time   Tue Jul 11, 2023  6:28 AM    Sandie discharge to home/self care.                Follow-up Information     Follow up With Specialties Details Why Contact Info Additional Information    [facility]   [address] [facility] [id number] [phone] [address]D [address]YB[address] UMIGTQ [address]UYZZY[facility]KL[facility], [address], [place], [address] [phone]          Discharge Medication List as of 7/11/2023  6:28 AM      CONTINUE these medications which have NOT CHANGED    Details   acetaminophen (TYLENOL) 325 mg tablet Take 2 tablets (650 mg total) by mouth every 6 (six) hours as needed for mild pain, Starting Wed 11/16/2022, Normal      pantoprazole (PROTONIX) 40 mg tablet Take 1 tablet (40 mg total) by mouth daily, Starting Wed 11/16/2022, Until Fri 12/16/2022, Normal      sucralfate (CARAFATE) 1 g/10 mL suspension Take 10 mL (1 g total) by mouth 4 (four) times a day for 14 days, Starting Wed 11/16/2022, Until Wed 11/30/2022, Normal                 PDMP Review     None          ED Provider  Electronically Signed by           Bob Kruse MD  07/11/23 7601

## 2023-08-10 ENCOUNTER — APPOINTMENT (EMERGENCY)
Dept: RADIOLOGY | Facility: HOSPITAL | Age: 48
End: 2023-08-10
Payer: COMMERCIAL

## 2023-08-10 ENCOUNTER — HOSPITAL ENCOUNTER (EMERGENCY)
Facility: HOSPITAL | Age: 48
Discharge: HOME | End: 2023-08-10
Attending: INTERNAL MEDICINE
Payer: COMMERCIAL

## 2023-08-10 VITALS
SYSTOLIC BLOOD PRESSURE: 139 MMHG | TEMPERATURE: 98.4 F | RESPIRATION RATE: 17 BRPM | DIASTOLIC BLOOD PRESSURE: 70 MMHG | OXYGEN SATURATION: 98 % | HEART RATE: 103 BPM

## 2023-08-10 DIAGNOSIS — E11.9 NEW ONSET TYPE 2 DIABETES MELLITUS (HCC): Primary | ICD-10-CM

## 2023-08-10 DIAGNOSIS — R51.9 HEADACHE: ICD-10-CM

## 2023-08-10 LAB
ALBUMIN SERPL BCP-MCNC: 4.2 G/DL (ref 3.5–5)
ALP SERPL-CCNC: 126 U/L (ref 34–104)
ALT SERPL W P-5'-P-CCNC: 16 U/L (ref 7–52)
ANION GAP SERPL CALCULATED.3IONS-SCNC: 12 MMOL/L
AST SERPL W P-5'-P-CCNC: 12 U/L (ref 13–39)
BASOPHILS # BLD AUTO: 0.01 THOUSANDS/ÂΜL (ref 0–0.1)
BASOPHILS NFR BLD AUTO: 0 % (ref 0–1)
BILIRUB SERPL-MCNC: 0.44 MG/DL (ref 0.2–1)
BUN SERPL-MCNC: 14 MG/DL (ref 5–25)
CALCIUM SERPL-MCNC: 9.6 MG/DL (ref 8.4–10.2)
CARDIAC TROPONIN I PNL SERPL HS: 3 NG/L (ref 8–18)
CHLORIDE SERPL-SCNC: 100 MMOL/L (ref 96–108)
CO2 SERPL-SCNC: 21 MMOL/L (ref 21–32)
CREAT SERPL-MCNC: 0.77 MG/DL (ref 0.6–1.3)
EOSINOPHIL # BLD AUTO: 0.08 THOUSAND/ÂΜL (ref 0–0.61)
EOSINOPHIL NFR BLD AUTO: 1 % (ref 0–6)
ERYTHROCYTE [DISTWIDTH] IN BLOOD BY AUTOMATED COUNT: 13.5 % (ref 11.6–15.1)
GFR SERPL CREATININE-BSD FRML MDRD: 108 ML/MIN/1.73SQ M
GLUCOSE SERPL-MCNC: 327 MG/DL (ref 65–140)
GLUCOSE SERPL-MCNC: 364 MG/DL (ref 65–140)
GLUCOSE SERPL-MCNC: 459 MG/DL (ref 65–140)
HCT VFR BLD AUTO: 41.6 % (ref 36.5–49.3)
HGB BLD-MCNC: 14.3 G/DL (ref 12–17)
IMM GRANULOCYTES # BLD AUTO: 0.02 THOUSAND/UL (ref 0–0.2)
IMM GRANULOCYTES NFR BLD AUTO: 0 % (ref 0–2)
LYMPHOCYTES # BLD AUTO: 1.73 THOUSANDS/ÂΜL (ref 0.6–4.47)
LYMPHOCYTES NFR BLD AUTO: 31 % (ref 14–44)
MCH RBC QN AUTO: 27.8 PG (ref 26.8–34.3)
MCHC RBC AUTO-ENTMCNC: 34.4 G/DL (ref 31.4–37.4)
MCV RBC AUTO: 81 FL (ref 82–98)
MONOCYTES # BLD AUTO: 0.43 THOUSAND/ÂΜL (ref 0.17–1.22)
MONOCYTES NFR BLD AUTO: 8 % (ref 4–12)
NEUTROPHILS # BLD AUTO: 3.36 THOUSANDS/ÂΜL (ref 1.85–7.62)
NEUTS SEG NFR BLD AUTO: 60 % (ref 43–75)
NRBC BLD AUTO-RTO: 0 /100 WBCS
PLATELET # BLD AUTO: 282 THOUSANDS/UL (ref 149–390)
PMV BLD AUTO: 10.2 FL (ref 8.9–12.7)
POTASSIUM SERPL-SCNC: 3.5 MMOL/L (ref 3.5–5.3)
PROT SERPL-MCNC: 8.1 G/DL (ref 6.4–8.4)
RBC # BLD AUTO: 5.15 MILLION/UL (ref 3.88–5.62)
SODIUM SERPL-SCNC: 133 MMOL/L (ref 135–147)
WBC # BLD AUTO: 5.63 THOUSAND/UL (ref 4.31–10.16)

## 2023-08-10 PROCEDURE — 96375 TX/PRO/DX INJ NEW DRUG ADDON: CPT

## 2023-08-10 PROCEDURE — 96372 THER/PROPH/DIAG INJ SC/IM: CPT

## 2023-08-10 PROCEDURE — 80053 COMPREHEN METABOLIC PANEL: CPT | Performed by: INTERNAL MEDICINE

## 2023-08-10 PROCEDURE — 82948 REAGENT STRIP/BLOOD GLUCOSE: CPT

## 2023-08-10 PROCEDURE — 71045 X-RAY EXAM CHEST 1 VIEW: CPT

## 2023-08-10 PROCEDURE — 99285 EMERGENCY DEPT VISIT HI MDM: CPT | Performed by: INTERNAL MEDICINE

## 2023-08-10 PROCEDURE — 96361 HYDRATE IV INFUSION ADD-ON: CPT

## 2023-08-10 PROCEDURE — 36415 COLL VENOUS BLD VENIPUNCTURE: CPT | Performed by: INTERNAL MEDICINE

## 2023-08-10 PROCEDURE — 85025 COMPLETE CBC W/AUTO DIFF WBC: CPT | Performed by: INTERNAL MEDICINE

## 2023-08-10 PROCEDURE — 96374 THER/PROPH/DIAG INJ IV PUSH: CPT

## 2023-08-10 PROCEDURE — 99283 EMERGENCY DEPT VISIT LOW MDM: CPT

## 2023-08-10 PROCEDURE — 84484 ASSAY OF TROPONIN QUANT: CPT | Performed by: INTERNAL MEDICINE

## 2023-08-10 RX ORDER — SODIUM CHLORIDE 9 MG/ML
125 INJECTION, SOLUTION INTRAVENOUS CONTINUOUS
Status: DISCONTINUED | OUTPATIENT
Start: 2023-08-10 | End: 2023-08-10 | Stop reason: HOSPADM

## 2023-08-10 RX ORDER — METOCLOPRAMIDE HYDROCHLORIDE 5 MG/ML
10 INJECTION INTRAMUSCULAR; INTRAVENOUS ONCE
Status: COMPLETED | OUTPATIENT
Start: 2023-08-10 | End: 2023-08-10

## 2023-08-10 RX ORDER — KETOROLAC TROMETHAMINE 30 MG/ML
15 INJECTION, SOLUTION INTRAMUSCULAR; INTRAVENOUS ONCE
Status: COMPLETED | OUTPATIENT
Start: 2023-08-10 | End: 2023-08-10

## 2023-08-10 RX ADMIN — SODIUM CHLORIDE 1000 ML: 0.9 INJECTION, SOLUTION INTRAVENOUS at 09:37

## 2023-08-10 RX ADMIN — METOCLOPRAMIDE 10 MG: 5 INJECTION, SOLUTION INTRAMUSCULAR; INTRAVENOUS at 08:37

## 2023-08-10 RX ADMIN — INSULIN HUMAN 10 UNITS: 100 INJECTION, SOLUTION PARENTERAL at 09:38

## 2023-08-10 RX ADMIN — SODIUM CHLORIDE 125 ML/HR: 0.9 INJECTION, SOLUTION INTRAVENOUS at 08:37

## 2023-08-10 RX ADMIN — KETOROLAC TROMETHAMINE 15 MG: 30 INJECTION, SOLUTION INTRAMUSCULAR; INTRAVENOUS at 08:37

## 2023-08-10 NOTE — ED PROVIDER NOTES
History  Chief Complaint   Patient presents with   • Headache     Pt presents to ed for generalized headache that started about 1.5 weeks ago with no relief with otc meds     This is a 57years old came for having headache for few weeks. Patient has no fever, neck pain, neck stiffness. Patient to keep taking OTC medication with mild relief. Patient also complaining of chest pain for about 1 months. The pain increases when he take deep breaths. Patient has no nausea vomiting diaphoresis. Patient has no history of CAD. Patient has no medical history except GERD and takes no medication except pantoprazole and Carafate for GERD. Stated chest pain is a pressure-like. Patient sitting in no distress has no SOB, abdominal pain. Patient denies any numbness or tingling of upper or lower extremities. Prior to Admission Medications   Prescriptions Last Dose Informant Patient Reported? Taking?   acetaminophen (TYLENOL) 325 mg tablet   No No   Sig: Take 2 tablets (650 mg total) by mouth every 6 (six) hours as needed for mild pain   Patient not taking: Reported on 7/11/2023   pantoprazole (PROTONIX) 40 mg tablet   No No   Sig: Take 1 tablet (40 mg total) by mouth daily   sucralfate (CARAFATE) 1 g/10 mL suspension   No No   Sig: Take 10 mL (1 g total) by mouth 4 (four) times a day for 14 days      Facility-Administered Medications: None       History reviewed. No pertinent past medical history. History reviewed. No pertinent surgical history. History reviewed. No pertinent family history. I have reviewed and agree with the history as documented.     E-Cigarette/Vaping   • E-Cigarette Use Never User      E-Cigarette/Vaping Substances   • Nicotine No    • THC No    • CBD No    • Flavoring No    • Other No    • Unknown No      Social History     Tobacco Use   • Smoking status: Every Day     Packs/day: 1.00     Years: 23.00     Total pack years: 23.00     Types: Cigarettes   • Smokeless tobacco: Never   Vaping Use   • Vaping Use: Never used   Substance Use Topics   • Alcohol use: Not Currently     Comment: socially   • Drug use: No       Review of Systems   Constitutional: Negative for diaphoresis, fatigue and fever. HENT: Negative for sinus pressure, sinus pain, sneezing, sore throat and tinnitus. Respiratory: Negative for cough, chest tightness and shortness of breath. Cardiovascular: Positive for chest pain. Negative for palpitations and leg swelling. Gastrointestinal: Negative for abdominal pain, diarrhea, nausea and vomiting. Genitourinary: Negative for difficulty urinating, dysuria, flank pain and hematuria. Musculoskeletal: Negative for arthralgias, back pain, gait problem, neck pain and neck stiffness. Skin: Negative for color change, pallor and rash. Neurological: Negative for dizziness, tremors, seizures, syncope, speech difficulty, weakness, light-headedness, numbness and headaches. Hematological: Negative for adenopathy. Does not bruise/bleed easily. Psychiatric/Behavioral: Negative for agitation and behavioral problems. Physical Exam  Physical Exam  Vitals and nursing note reviewed. Constitutional:       General: He is not in acute distress. Appearance: He is well-developed. He is not ill-appearing, toxic-appearing or diaphoretic. HENT:      Head: Normocephalic and atraumatic. Right Ear: Tympanic membrane and ear canal normal.      Left Ear: Tympanic membrane and ear canal normal.      Nose: Nose normal. No congestion or rhinorrhea. Mouth/Throat:      Pharynx: No oropharyngeal exudate or posterior oropharyngeal erythema. Eyes:      General: No scleral icterus. Right eye: No discharge. Left eye: No discharge. Extraocular Movements: Extraocular movements intact. Pupils: Pupils are equal, round, and reactive to light. Neck:      Vascular: No carotid bruit. Cardiovascular:      Rate and Rhythm: Normal rate and regular rhythm.       Heart sounds: Normal heart sounds. No murmur heard. No friction rub. No gallop. Pulmonary:      Effort: Pulmonary effort is normal. No respiratory distress. Breath sounds: Normal breath sounds. No wheezing. Chest:      Chest wall: No tenderness. Abdominal:      General: Bowel sounds are normal. There is no distension. Palpations: Abdomen is soft. There is no mass. Tenderness: There is no abdominal tenderness. There is no right CVA tenderness, left CVA tenderness, guarding or rebound. Hernia: No hernia is present. Musculoskeletal:         General: No swelling, tenderness, deformity or signs of injury. Normal range of motion. Cervical back: Normal range of motion and neck supple. No rigidity or tenderness. Right lower leg: No edema. Left lower leg: No edema. Lymphadenopathy:      Cervical: No cervical adenopathy. Skin:     General: Skin is warm and dry. Capillary Refill: Capillary refill takes less than 2 seconds. Coloration: Skin is not jaundiced or pale. Findings: No bruising, erythema, lesion or rash. Neurological:      General: No focal deficit present. Mental Status: He is alert and oriented to person, place, and time. Cranial Nerves: No cranial nerve deficit. Sensory: No sensory deficit. Motor: No weakness.       Coordination: Coordination normal.      Deep Tendon Reflexes: Reflexes normal.   Psychiatric:         Behavior: Behavior normal.         Vital Signs  ED Triage Vitals [08/10/23 0802]   Temperature Pulse Respirations Blood Pressure SpO2   98.4 °F (36.9 °C) 103 17 139/70 98 %      Temp src Heart Rate Source Patient Position - Orthostatic VS BP Location FiO2 (%)   -- Monitor -- Left arm --      Pain Score       6           Vitals:    08/10/23 0802   BP: 139/70   Pulse: 103         Visual Acuity      ED Medications  Medications   ketorolac (TORADOL) injection 15 mg (15 mg Intravenous Given 8/10/23 0837)   metoclopramide (REGLAN) injection 10 mg (10 mg Intravenous Given 8/10/23 0837)   sodium chloride 0.9 % bolus 1,000 mL (0 mL Intravenous Stopped 8/10/23 1037)   insulin regular (HumuLIN R,NovoLIN R) injection 10 Units (10 Units Subcutaneous Given 8/10/23 0938)       Diagnostic Studies  Results Reviewed     Procedure Component Value Units Date/Time    Fingerstick Glucose (POCT) [[id number]]  (Abnormal) Collected: 08/10/23 1145    Lab Status: Final result Updated: 08/10/23 1147     POC Glucose 327 mg/dl     Fingerstick Glucose (POCT) [[id number]]  (Abnormal) Collected: 08/10/23 1051    Lab Status: Final result Updated: 08/10/23 1052     POC Glucose 364 mg/dl     High Sensitivity Troponin I Random [[id number]]  (Abnormal) Collected: 08/10/23 0834    Lab Status: Final result Specimen: Blood from Arm, Right Updated: 08/10/23 0905     HS TnI random 3 ng/L     Comprehensive metabolic panel [[id number]]  (Abnormal) Collected: 08/10/23 0834    Lab Status: Final result Specimen: Blood from Arm, Right Updated: 08/10/23 0856     Sodium 133 mmol/L      Potassium 3.5 mmol/L      Chloride 100 mmol/L      CO2 21 mmol/L      ANION GAP 12 mmol/L      BUN 14 mg/dL      Creatinine 0.77 mg/dL      Glucose 459 mg/dL      Calcium 9.6 mg/dL      AST 12 U/L      ALT 16 U/L      Alkaline Phosphatase 126 U/L      Total Protein 8.1 g/dL      Albumin 4.2 g/dL      Total Bilirubin 0.44 mg/dL      eGFR 108 ml/min/1.73sq m     Narrative:      [place] guidelines for Chronic Kidney Disease (CKD):   •  Stage 1 with normal or high GFR (GFR > 90 mL/min/1.73 square meters)  •  Stage 2 Mild CKD (GFR = 60-89 mL/min/1.73 square meters)  •  Stage 3A Moderate CKD (GFR = 45-59 mL/min/1.73 square meters)  •  Stage 3B Moderate CKD (GFR = 30-44 mL/min/1.73 square meters)  •  Stage 4 Severe CKD (GFR = 15-29 mL/min/1.73 square meters)  •  Stage 5 End Stage CKD (GFR <15 mL/min/1.73 square meters)  Note: GFR calculation is accurate only with a steady state creatinine    CBC and differential [[id number]]  (Abnormal) Collected: 08/10/23 0834    Lab Status: Final result Specimen: Blood from Arm, Right Updated: 08/10/23 0842     WBC 5.63 Thousand/uL      RBC 5.15 Million/uL      Hemoglobin 14.3 g/dL      Hematocrit 41.6 %      MCV 81 fL      MCH 27.8 pg      MCHC 34.4 g/dL      RDW 13.5 %      MPV 10.2 fL      Platelets 075 Thousands/uL      nRBC 0 /100 WBCs      Neutrophils Relative 60 %      Immat GRANS % 0 %      Lymphocytes Relative 31 %      Monocytes Relative 8 %      Eosinophils Relative 1 %      Basophils Relative 0 %      Neutrophils Absolute 3.36 Thousands/µL      Immature Grans Absolute 0.02 Thousand/uL      Lymphocytes Absolute 1.73 Thousands/µL      Monocytes Absolute 0.43 Thousand/µL      Eosinophils Absolute 0.08 Thousand/µL      Basophils Absolute 0.01 Thousands/µL                  XR chest 1 view portable   Final Result by Alexx Mcdaniels MD (08/10 1705)      No acute cardiopulmonary disease. Workstation performed: [id number]                    Procedures  ECG 12 Lead Documentation Only    Date/Time: 8/10/2023 8:28 AM    Performed by: Remi Cooper MD  Authorized by: Remi Cooper MD    Indications / Diagnosis:  Chest pain  ECG reviewed by me, the ED Provider: yes    Patient location:  ED  Previous ECG:     Previous ECG:  Unavailable  Interpretation:     Interpretation: abnormal    Rate:     ECG rate:  98    ECG rate assessment: normal    Rhythm:     Rhythm: sinus rhythm    Ectopy:     Ectopy: none    QRS:     QRS axis:  Normal    QRS intervals:  Normal  Conduction:     Conduction: normal    ST segments:     ST segments:  Normal  T waves:     T waves: normal    Comments:      Low voltage             ED Course  ED Course as of 08/13/23 1324   Thu Aug 10, 2023   0926 On the labs found his BS is 459 and patient is not diabetic. Will start him on insulin and IV fluids.                                              Medical Decision Making  This is a 57years old came for having headache. Patient also stated that he has chest pain for 1 months. Patient take OTC medication with little help. Physical exam came back normal.  EKG shows no ischemic changes. Troponin 3. CXR no acute cardiopulmonary findings. Labs reviewed and found that his blood sugar of 459. Patient stated that his sister is also diabetic. Patient received IV fluids and insulin at the ER. The blood sugar goes down to 327. Patient told her that this is a new onset of diabetes and he need to start on metformin for it and follow-up with medical clinic. Patient headache is gone. The case discussed with the patient about the food of diabetic patients. All question concerns of patient have been fully addressed. Amount and/or Complexity of Data Reviewed  Labs: ordered. Details: Labs came back within normal limit except . After receiving insulin and IV fluids goes down to 327. Radiology: ordered. Details: CXR no acute cardiopulmonary findings. ECG/medicine tests: ordered. Details: EKG normal sinus rhythm rate 98 low voltage. Risk  OTC drugs. Prescription drug management. Disposition  Final diagnoses:   New onset type 2 diabetes mellitus ([address])   Headache     Time reflects when diagnosis was documented in both MDM as applicable and the Disposition within this note     Time User Action Codes Description Comment    8/10/2023 12:03 PM Rosa M Mcgrath Add [E11.9] New onset type 2 diabetes mellitus ([address])     8/10/2023 12:03 PM Yahaira Javed Add [R51.9] Headache       ED Disposition     ED Disposition   Discharge    Condition   Stable    Date/Time   Thu Aug 10, 2023 12:05 PM    [facility] discharge to home/self care.                Follow-up Information     Follow up With Specialties Details Why Contact Info Additional Information    [facility] [address] [facility] In 1 week  [facility] [address], [facility], [address] [place] [address] [facility] [place], [address], [phone]          Discharge Medication List as of 8/10/2023 12:05 PM      START taking these medications    Details   metFORMIN (GLUCOPHAGE) 500 mg tablet Take 1 tablet (500 mg total) by mouth 2 (two) times a day with meals, Starting Thu 8/10/2023, Until Sat 9/9/2023, Normal         CONTINUE these medications which have NOT CHANGED    Details   acetaminophen (TYLENOL) 325 mg tablet Take 2 tablets (650 mg total) by mouth every 6 (six) hours as needed for mild pain, Starting Wed 11/16/2022, Normal      pantoprazole (PROTONIX) 40 mg tablet Take 1 tablet (40 mg total) by mouth daily, Starting Wed 11/16/2022, Until Fri 12/16/2022, Normal      sucralfate (CARAFATE) 1 g/10 mL suspension Take 10 mL (1 g total) by mouth 4 (four) times a day for 14 days, Starting Wed 11/16/2022, Until Wed 11/30/2022, Normal             No discharge procedures on file.     PDMP Review     None          ED Provider  Electronically Signed by           Gilson Noel MD  08/13/23 2834

## 2023-08-10 NOTE — DISCHARGE INSTRUCTIONS
Take medication as prescribed. Take Motrin for the headache or Aleve as needed. Follow-up with Mike Saldivar [facility]. Labs Reviewed   COMPREHENSIVE METABOLIC PANEL - Abnormal       Result Value Ref Range Status    Sodium 133 (*) 135 - 147 mmol/L Final    Potassium 3.5  3.5 - 5.3 mmol/L Final    Chloride 100  96 - 108 mmol/L Final    CO2 21  21 - 32 mmol/L Final    ANION GAP 12  mmol/L Final    BUN 14  5 - 25 mg/dL Final    Creatinine 0.77  0.60 - 1.30 mg/dL Final    Comment: Standardized to IDMS reference method    Glucose 459 (*) 65 - 140 mg/dL Final    Comment: If the patient is fasting, the ADA then defines impaired fasting glucose as > 100 mg/dL and diabetes as > or equal to 123 mg/dL. Calcium 9.6  8.4 - 10.2 mg/dL Final    AST 12 (*) 13 - 39 U/L Final    ALT 16  7 - 52 U/L Final    Comment: Specimen collection should occur prior to Sulfasalazine administration due to the potential for falsely depressed results. Alkaline Phosphatase 126 (*) 34 - 104 U/L Final    Total Protein 8.1  6.4 - 8.4 g/dL Final    Albumin 4.2  3.5 - 5.0 g/dL Final    Total Bilirubin 0.44  0.20 - 1.00 mg/dL Final    Comment: Use of this assay is not recommended for patients undergoing treatment with eltrombopag due to the potential for falsely elevated results. N-acetyl-p-benzoquinone imine (metabolite of Acetaminophen) will generate erroneously low results in samples for patients that have taken an overdose of Acetaminophen.     eGFR 108  ml/min/1.73sq m Final    Narrative:     [place] guidelines for Chronic Kidney Disease (CKD):     Stage 1 with normal or high GFR (GFR > 90 mL/min/1.73 square meters)    Stage 2 Mild CKD (GFR = 60-89 mL/min/1.73 square meters)    Stage 3A Moderate CKD (GFR = 45-59 mL/min/1.73 square meters)    Stage 3B Moderate CKD (GFR = 30-44 mL/min/1.73 square meters)    Stage 4 Severe CKD (GFR = 15-29 mL/min/1.73 square meters)    Stage 5 End Stage CKD (GFR <15 mL/min/1.73 square meters)  Note: GFR calculation is accurate only with a steady state creatinine   CBC AND DIFFERENTIAL - Abnormal    WBC 5.63  4.31 - 10.16 Thousand/uL Final    RBC 5.15  3.88 - 5.62 Million/uL Final    Hemoglobin 14.3  12.0 - 17.0 g/dL Final    Hematocrit 41.6  36.5 - 49.3 % Final    MCV 81 (*) 82 - 98 fL Final    MCH 27.8  26.8 - 34.3 pg Final    MCHC 34.4  31.4 - 37.4 g/dL Final    RDW 13.5  11.6 - 15.1 % Final    MPV 10.2  8.9 - 12.7 fL Final    Platelets 474  814 - 390 Thousands/uL Final    nRBC 0  /100 WBCs Final    Neutrophils Relative 60  43 - 75 % Final    Immat GRANS % 0  0 - 2 % Final    Lymphocytes Relative 31  14 - 44 % Final    Monocytes Relative 8  4 - 12 % Final    Eosinophils Relative 1  0 - 6 % Final    Basophils Relative 0  0 - 1 % Final    Neutrophils Absolute 3.36  1.85 - 7.62 Thousands/µL Final    Immature Grans Absolute 0.02  0.00 - 0.20 Thousand/uL Final    Lymphocytes Absolute 1.73  0.60 - 4.47 Thousands/µL Final    Monocytes Absolute 0.43  0.17 - 1.22 Thousand/µL Final    Eosinophils Absolute 0.08  0.00 - 0.61 Thousand/µL Final    Basophils Absolute 0.01  0.00 - 0.10 Thousands/µL Final   HIGH SENSITIVITY TROPONIN I RANDOM - Abnormal    HS TnI random 3 (*) 8 - 18 ng/L Final    Comment:                                              Initial (time 0) result  If >=50 ng/L, Myocardial injury suggested ;  Type of myocardial injury and treatment strategy  to be determined. If 5-49 ng/L, a delta result at 2 hours and or 4 hours will be needed to further evaluate. If <4 ng/L, and chest pain has been >3 hours since onset, patient may qualify for discharge based on the HEART score in the ED. If <5 ng/L and <3hours since onset of chest pain, a delta result at 2 hours will be needed to further evaluate. HS Troponin 99th Percentile URL of a Health Population=12 ng/L with a 95% Confidence Interval of 8-18 ng/L.     Second Troponin (time 2 hours)  If calculated delta >= 20 ng/L,  Myocardial injury suggested ; Type of myocardial injury and treatment strategy to be determined. If 5-49 ng/L and the calculated delta is 5-19 ng/L, consult medical service for evaluation. Continue evaluation for ischemia on ecg and other possible etiology and repeat hs troponin at 4 hours. If delta is <5 ng/L at 2 hours, consider discharge based on risk stratification via the HEART score (if in ED), or JORGE risk score in IP/Observation. HS Troponin 99th Percentile URL of a Health Population=12 ng/L with a 95% Confidence Interval of 8-18 ng/L. POCT GLUCOSE - Abnormal    POC Glucose 364 (*) 65 - 140 mg/dl Final    Comment: CRITICAL VALUE NOTED   POCT GLUCOSE - Abnormal    POC Glucose 327 (*) 65 - 140 mg/dl Final     XR chest 1 view portable   Final Result      No acute cardiopulmonary disease.                   Workstation performed: [id number]

## 2024-08-28 ENCOUNTER — APPOINTMENT (EMERGENCY)
Dept: RADIOLOGY | Facility: HOSPITAL | Age: 49
End: 2024-08-28
Payer: COMMERCIAL

## 2024-08-28 ENCOUNTER — HOSPITAL ENCOUNTER (EMERGENCY)
Facility: HOSPITAL | Age: 49
Discharge: HOME | End: 2024-08-28
Attending: EMERGENCY MEDICINE
Payer: COMMERCIAL

## 2024-08-28 ENCOUNTER — APPOINTMENT (EMERGENCY)
Dept: CT IMAGING | Facility: HOSPITAL | Age: 49
End: 2024-08-28
Payer: COMMERCIAL

## 2024-08-28 VITALS
RESPIRATION RATE: 20 BRPM | OXYGEN SATURATION: 92 % | HEART RATE: 78 BPM | DIASTOLIC BLOOD PRESSURE: 51 MMHG | TEMPERATURE: 98.5 F | SYSTOLIC BLOOD PRESSURE: 91 MMHG

## 2024-08-28 DIAGNOSIS — R51.9 HEADACHE: ICD-10-CM

## 2024-08-28 DIAGNOSIS — R07.9 CHEST PAIN, UNSPECIFIED TYPE: Primary | ICD-10-CM

## 2024-08-28 LAB
ALBUMIN SERPL BCG-MCNC: 4.2 G/DL (ref 3.5–5)
ALP SERPL-CCNC: 117 U/L (ref 34–104)
ALT SERPL W P-5'-P-CCNC: 24 U/L (ref 7–52)
ANION GAP SERPL CALCULATED.3IONS-SCNC: 11 MMOL/L (ref 4–13)
AST SERPL W P-5'-P-CCNC: 15 U/L (ref 13–39)
BASOPHILS # BLD AUTO: 0.04 THOUSANDS/ÂΜL (ref 0–0.1)
BASOPHILS NFR BLD AUTO: 1 % (ref 0–1)
BILIRUB SERPL-MCNC: 0.38 MG/DL (ref 0.2–1)
BNP SERPL-MCNC: 12 PG/ML (ref 0–100)
BUN SERPL-MCNC: 13 MG/DL (ref 5–25)
CALCIUM SERPL-MCNC: 9.9 MG/DL (ref 8.4–10.2)
CARDIAC TROPONIN I PNL SERPL HS: <2 NG/L
CHLORIDE SERPL-SCNC: 104 MMOL/L (ref 96–108)
CO2 SERPL-SCNC: 23 MMOL/L (ref 21–32)
CREAT SERPL-MCNC: 0.7 MG/DL (ref 0.6–1.3)
D DIMER PPP FEU-MCNC: <0.27 UG/ML FEU
EOSINOPHIL # BLD AUTO: 0.33 THOUSAND/ÂΜL (ref 0–0.61)
EOSINOPHIL NFR BLD AUTO: 4 % (ref 0–6)
ERYTHROCYTE [DISTWIDTH] IN BLOOD BY AUTOMATED COUNT: 15.4 % (ref 11.6–15.1)
FLUAV RNA RESP QL NAA+PROBE: NEGATIVE
FLUBV RNA RESP QL NAA+PROBE: NEGATIVE
GFR SERPL CREATININE-BSD FRML MDRD: 112 ML/MIN/1.73SQ M
GLUCOSE SERPL-MCNC: 170 MG/DL (ref 65–140)
HCT VFR BLD AUTO: 45.8 % (ref 36.5–49.3)
HGB BLD-MCNC: 14.8 G/DL (ref 12–17)
IMM GRANULOCYTES # BLD AUTO: 0.02 THOUSAND/UL (ref 0–0.2)
IMM GRANULOCYTES NFR BLD AUTO: 0 % (ref 0–2)
LYMPHOCYTES # BLD AUTO: 2.07 THOUSANDS/ÂΜL (ref 0.6–4.47)
LYMPHOCYTES NFR BLD AUTO: 25 % (ref 14–44)
MCH RBC QN AUTO: 26.3 PG (ref 26.8–34.3)
MCHC RBC AUTO-ENTMCNC: 32.3 G/DL (ref 31.4–37.4)
MCV RBC AUTO: 82 FL (ref 82–98)
MONOCYTES # BLD AUTO: 0.75 THOUSAND/ÂΜL (ref 0.17–1.22)
MONOCYTES NFR BLD AUTO: 9 % (ref 4–12)
NEUTROPHILS # BLD AUTO: 5.04 THOUSANDS/ÂΜL (ref 1.85–7.62)
NEUTS SEG NFR BLD AUTO: 61 % (ref 43–75)
NRBC BLD AUTO-RTO: 0 /100 WBCS
PLATELET # BLD AUTO: 364 THOUSANDS/UL (ref 149–390)
PMV BLD AUTO: 9.2 FL (ref 8.9–12.7)
POTASSIUM SERPL-SCNC: 3.8 MMOL/L (ref 3.5–5.3)
PROT SERPL-MCNC: 8.8 G/DL (ref 6.4–8.4)
RBC # BLD AUTO: 5.62 MILLION/UL (ref 3.88–5.62)
RSV RNA RESP QL NAA+PROBE: NEGATIVE
SARS-COV-2 RNA RESP QL NAA+PROBE: NEGATIVE
SODIUM SERPL-SCNC: 138 MMOL/L (ref 135–147)
WBC # BLD AUTO: 8.25 THOUSAND/UL (ref 4.31–10.16)

## 2024-08-28 PROCEDURE — 70450 CT HEAD/BRAIN W/O DYE: CPT

## 2024-08-28 PROCEDURE — 93005 ELECTROCARDIOGRAM TRACING: CPT

## 2024-08-28 PROCEDURE — 96375 TX/PRO/DX INJ NEW DRUG ADDON: CPT

## 2024-08-28 PROCEDURE — 96374 THER/PROPH/DIAG INJ IV PUSH: CPT

## 2024-08-28 PROCEDURE — 0241U HB NFCT DS VIR RESP RNA 4 TRGT: CPT | Performed by: EMERGENCY MEDICINE

## 2024-08-28 PROCEDURE — 85379 FIBRIN DEGRADATION QUANT: CPT | Performed by: EMERGENCY MEDICINE

## 2024-08-28 PROCEDURE — 96361 HYDRATE IV INFUSION ADD-ON: CPT

## 2024-08-28 PROCEDURE — 99285 EMERGENCY DEPT VISIT HI MDM: CPT

## 2024-08-28 PROCEDURE — 84484 ASSAY OF TROPONIN QUANT: CPT

## 2024-08-28 PROCEDURE — 99285 EMERGENCY DEPT VISIT HI MDM: CPT | Performed by: EMERGENCY MEDICINE

## 2024-08-28 PROCEDURE — 83880 ASSAY OF NATRIURETIC PEPTIDE: CPT | Performed by: EMERGENCY MEDICINE

## 2024-08-28 PROCEDURE — 71045 X-RAY EXAM CHEST 1 VIEW: CPT

## 2024-08-28 PROCEDURE — 80053 COMPREHEN METABOLIC PANEL: CPT

## 2024-08-28 PROCEDURE — 36415 COLL VENOUS BLD VENIPUNCTURE: CPT

## 2024-08-28 PROCEDURE — 85025 COMPLETE CBC W/AUTO DIFF WBC: CPT

## 2024-08-28 RX ORDER — DIPHENHYDRAMINE HYDROCHLORIDE 50 MG/ML
25 INJECTION INTRAMUSCULAR; INTRAVENOUS ONCE
Status: COMPLETED | OUTPATIENT
Start: 2024-08-28 | End: 2024-08-28

## 2024-08-28 RX ORDER — METOCLOPRAMIDE HYDROCHLORIDE 5 MG/ML
10 INJECTION INTRAMUSCULAR; INTRAVENOUS ONCE
Status: COMPLETED | OUTPATIENT
Start: 2024-08-28 | End: 2024-08-28

## 2024-08-28 RX ORDER — ACETAMINOPHEN 325 MG/1
975 TABLET ORAL ONCE
Status: COMPLETED | OUTPATIENT
Start: 2024-08-28 | End: 2024-08-28

## 2024-08-28 RX ORDER — KETOROLAC TROMETHAMINE 30 MG/ML
15 INJECTION, SOLUTION INTRAMUSCULAR; INTRAVENOUS ONCE
Status: COMPLETED | OUTPATIENT
Start: 2024-08-28 | End: 2024-08-28

## 2024-08-28 RX ADMIN — DIPHENHYDRAMINE HYDROCHLORIDE 25 MG: 50 INJECTION, SOLUTION INTRAMUSCULAR; INTRAVENOUS at 07:37

## 2024-08-28 RX ADMIN — ACETAMINOPHEN 975 MG: 325 TABLET, FILM COATED ORAL at 07:34

## 2024-08-28 RX ADMIN — METOCLOPRAMIDE 10 MG: 5 INJECTION, SOLUTION INTRAMUSCULAR; INTRAVENOUS at 07:40

## 2024-08-28 RX ADMIN — SODIUM CHLORIDE 1000 ML: 0.9 INJECTION, SOLUTION INTRAVENOUS at 07:36

## 2024-08-28 RX ADMIN — KETOROLAC TROMETHAMINE 15 MG: 30 INJECTION, SOLUTION INTRAMUSCULAR at 10:00

## 2024-08-28 NOTE — ED PROVIDER NOTES
"History  Chief Complaint   Patient presents with    Chest Pain     Pt presents to the ed with chest pain that started two-three days ago, reports a headache as well, no meds pta      49-year-old male with history of diabetes presenting for evaluation of headache and chest pain.  Patient is overall a poor historian.  He states that he has had a headache constantly for \"a long time.\"  He states that this started after he was diagnosed with diabetes but he cannot recall when this was.  The pain is generalized.  He does not have any nausea, vomiting, photophobia, vision changes.  He notes intermittent paresthesias in his bilateral hands.  He also states that both of his legs are completely numb.  This is also been going on for \"a long time\" but he again cannot identify exactly how long.  Also notes tightness and pain in his chest.  Ongoing for an unknown amount of time.  He denies any shortness of breath, fever, cough.        Prior to Admission Medications   Prescriptions Last Dose Informant Patient Reported? Taking?   acetaminophen (TYLENOL) 325 mg tablet   No No   Sig: Take 2 tablets (650 mg total) by mouth every 6 (six) hours as needed for mild pain   Patient not taking: Reported on 7/11/2023   metFORMIN (GLUCOPHAGE) 500 mg tablet   No No   Sig: Take 1 tablet (500 mg total) by mouth 2 (two) times a day with meals   pantoprazole (PROTONIX) 40 mg tablet   No No   Sig: Take 1 tablet (40 mg total) by mouth daily   sucralfate (CARAFATE) 1 g/10 mL suspension   No No   Sig: Take 10 mL (1 g total) by mouth 4 (four) times a day for 14 days      Facility-Administered Medications: None       History reviewed. No pertinent past medical history.    History reviewed. No pertinent surgical history.    History reviewed. No pertinent family history.  I have reviewed and agree with the history as documented.    E-Cigarette/Vaping    E-Cigarette Use Never User      E-Cigarette/Vaping Substances    Nicotine No     THC No     CBD No     " Flavoring No     Other No     Unknown No      Social History     Tobacco Use    Smoking status: Every Day     Current packs/day: 1.00     Average packs/day: 1 pack/day for 23.0 years (23.0 ttl pk-yrs)     Types: Cigarettes    Smokeless tobacco: Never   Vaping Use    Vaping status: Never Used   Substance Use Topics    Alcohol use: Not Currently     Comment: socially    Drug use: No       Review of Systems   Constitutional:  Negative for chills and fever.   Eyes:  Negative for photophobia and visual disturbance.   Respiratory:  Negative for cough and shortness of breath.    Cardiovascular:  Positive for chest pain.   Gastrointestinal:  Negative for abdominal pain, diarrhea, nausea and vomiting.   Genitourinary:  Negative for flank pain.   Musculoskeletal:  Negative for gait problem.   Skin:  Negative for rash.   Neurological:  Positive for numbness and headaches. Negative for weakness and light-headedness.   All other systems reviewed and are negative.      Physical Exam  Physical Exam  Vitals and nursing note reviewed.   Constitutional:       General: He is not in acute distress.     Appearance: He is not ill-appearing.   HENT:      Head: Normocephalic and atraumatic.      Nose: Nose normal.      Mouth/Throat:      Mouth: Oropharynx is clear and moist. Mucous membranes are moist.   Eyes:      Extraocular Movements: Extraocular movements intact.      Conjunctiva/sclera: Conjunctivae normal.      Pupils: Pupils are equal, round, and reactive to light.   Cardiovascular:      Rate and Rhythm: Regular rhythm. Tachycardia present.      Heart sounds: No murmur heard.     No friction rub. No gallop.   Pulmonary:      Effort: Pulmonary effort is normal.      Breath sounds: Normal breath sounds. No wheezing, rhonchi or rales.   Abdominal:      General: There is no distension.      Palpations: Abdomen is soft.      Tenderness: There is no abdominal tenderness.   Musculoskeletal:         General: No swelling, tenderness or  edema. Normal range of motion.      Cervical back: Normal range of motion and neck supple.   Skin:     General: Skin is warm and dry.      Coloration: Skin is not pale.      Findings: No rash.   Neurological:      Mental Status: He is alert and oriented to person, place, and time.      Cranial Nerves: No cranial nerve deficit.      Motor: No weakness.      Comments: Diminished sensation to bilateral lower extremities.  Sensation is intact to bilateral upper extremities.   Psychiatric:         Mood and Affect: Mood and affect normal.         Behavior: Behavior normal.         Vital Signs  ED Triage Vitals   Temperature Pulse Respirations Blood Pressure SpO2   08/28/24 0706 08/28/24 0706 08/28/24 0706 08/28/24 0706 08/28/24 0700   98.5 °F (36.9 °C) (!) 111 21 134/84 95 %      Temp Source Heart Rate Source Patient Position - Orthostatic VS BP Location FiO2 (%)   08/28/24 0706 08/28/24 0900 08/28/24 0900 08/28/24 0900 --   Oral Monitor Lying Left arm       Pain Score       08/28/24 0734       10 - Worst Possible Pain           Vitals:    08/28/24 0706 08/28/24 0900 08/28/24 1000   BP: 134/84 99/57 91/51   Pulse: (!) 111 86 78   Patient Position - Orthostatic VS:  Lying Lying         Visual Acuity      ED Medications  Medications   acetaminophen (TYLENOL) tablet 975 mg (975 mg Oral Given 8/28/24 0734)   sodium chloride 0.9 % bolus 1,000 mL (0 mL Intravenous Stopped 8/28/24 1021)   metoclopramide (REGLAN) injection 10 mg (10 mg Intravenous Given 8/28/24 0740)   diphenhydrAMINE (BENADRYL) injection 25 mg (25 mg Intravenous Given 8/28/24 0737)   ketorolac (TORADOL) injection 15 mg (15 mg Intravenous Given 8/28/24 1000)       Diagnostic Studies  Results Reviewed       Procedure Component Value Units Date/Time    FLU/RSV/COVID - if FLU/RSV clinically relevant [[id number]]  (Normal) Collected: 08/28/24 0745    Lab Status: Final result Specimen: Nares from Nose Updated: 08/28/24 0835     SARS-CoV-2 Negative     INFLUENZA A PCR  Negative     INFLUENZA B PCR Negative     RSV PCR Negative    Narrative:      This test has been performed using the CoV-2/Flu/RSV plus assay on the [employer] GeneXpert platform. This test has been validated by the  and verified by the performing laboratory.     This test is designed to amplify and detect the following: nucleocapsid (N), envelope (E), and RNA-dependent RNA polymerase (RdRP) genes of the SARS-CoV-2 genome; matrix (M), basic polymerase (PB2), and acidic protein (PA) segments of the influenza A genome; matrix (M) and non-structural protein (NS) segments of the influenza B genome, and the nucleocapsid genes of RSV A and RSV B.     Positive results are indicative of the presence of Flu A, Flu B, RSV, and/or SARS-CoV-2 RNA. Positive results for SARS-CoV-2 or suspected novel influenza should be reported to state, local, or federal health departments according to local reporting requirements.      All results should be assessed in conjunction with clinical presentation and other laboratory markers for clinical management.     FOR PEDIATRIC PATIENTS - copy/paste COVID Guidelines URL to browser: [email or web address]       B-Type Natriuretic Peptide(BNP) [[id number]]  (Normal) Collected: 08/28/24 0710    Lab Status: Final result Specimen: Blood from Arm, Left Updated: 08/28/24 0808     BNP 12 pg/mL     D-Dimer [[id number]]  (Normal) Collected: 08/28/24 0745    Lab Status: Final result Specimen: Blood from Arm, Left Updated: 08/28/24 0804     D-Dimer, Quant <0.27 ug/ml FEU     HS Troponin 0hr (reflex protocol) [[id number]]  (Normal) Collected: 08/28/24 0710    Lab Status: Final result Specimen: Blood from Arm, Left Updated: 08/28/24 0745     hs TnI 0hr <2 ng/L     Comprehensive metabolic panel [[id number]]  (Abnormal) Collected: 08/28/24 0710    Lab Status: Final result Specimen: Blood from Arm, Left Updated: 08/28/24 0739     Sodium 138 mmol/L       Potassium 3.8 mmol/L      Chloride 104 mmol/L      CO2 23 mmol/L      ANION GAP 11 mmol/L      BUN 13 mg/dL      Creatinine 0.70 mg/dL      Glucose 170 mg/dL      Calcium 9.9 mg/dL      AST 15 U/L      ALT 24 U/L      Alkaline Phosphatase 117 U/L      Total Protein 8.8 g/dL      Albumin 4.2 g/dL      Total Bilirubin 0.38 mg/dL      eGFR 112 ml/min/1.73sq m     Narrative:      National Kid[employer] Disease [employer] guidelines for Chronic Kidney Disease (CKD):     Stage 1 with normal or high GFR (GFR > 90 mL/min/1.73 square meters)    Stage 2 Mild CKD (GFR = 60-89 mL/min/1.73 square meters)    Stage 3A Moderate CKD (GFR = 45-59 mL/min/1.73 square meters)    Stage 3B Moderate CKD (GFR = 30-44 mL/min/1.73 square meters)    Stage 4 Severe CKD (GFR = 15-29 mL/min/1.73 square meters)    Stage 5 End Stage CKD (GFR <15 mL/min/1.73 square meters)  Note: GFR calculation is accurate only with a steady state creatinine    CBC and differential [[id number]]  (Abnormal) Collected: 08/28/24 0710    Lab Status: Final result Specimen: Blood from Arm, Left Updated: 08/28/24 0722     WBC 8.25 Thousand/uL      RBC 5.62 Million/uL      Hemoglobin 14.8 g/dL      Hematocrit 45.8 %      MCV 82 fL      MCH 26.3 pg      MCHC 32.3 g/dL      RDW 15.4 %      MPV 9.2 fL      Platelets 364 Thousands/uL      nRBC 0 /100 WBCs      Segmented % 61 %      Immature Grans % 0 %      Lymphocytes % 25 %      Monocytes % 9 %      Eosinophils Relative 4 %      Basophils Relative 1 %      Absolute Neutrophils 5.04 Thousands/µL      Absolute Immature Grans 0.02 Thousand/uL      Absolute Lymphocytes 2.07 Thousands/µL      Absolute Monocytes 0.75 Thousand/µL      Eosinophils Absolute 0.33 Thousand/µL      Basophils Absolute 0.04 Thousands/µL                    CT head without contrast   Final Result by Terence Burr MD (08/28 0856)      1.  No acute intracranial abnormality.   2.  Chronic right-sided sinus mucosal disease is similar to 2018.                   Workstation performed: [id number]         XR chest 1 view portable   Final Result by Klever Lomax MD (08/28 1011)      No acute cardiopulmonary disease.            Resident: Jose M Castro I, the attending radiologist, have reviewed the images and agree with the final report above.      Workstation performed: [id number]                    Procedures  Procedures         ED Course  ED Course as of 08/28/24 1225   Wed Aug 28, 2024   0802 Procedure Note: EKG  Date/Time: 08/28/24 7:05 AM   Interpreted by: Parisa Guzman  Indications / Diagnosis: CP  ECG reviewed by me, the ED Provider: yes   The EKG demonstrates:  Rhythm: rate 108, sinus tachycardia  Intervals: normal intervals  Axis: normal axis  QRS/Blocks: normal QRS  ST Changes: No acute ST Changes, no STD/MELODY.    0805 D-Dimer, Quant: <0.27   0808 hs TnI 0hr: <2   0841 FLU/RSV/COVID - if FLU/RSV clinically relevant   1020 Patient feeling improved.               HEART Risk Score      Flowsheet Row Most Recent Value   Heart Score Risk Calculator    History 0 Filed at: 08/28/2024 1021   ECG 0 Filed at: 08/28/2024 1021   Age 1 Filed at: 08/28/2024 1021   Risk Factors 1 Filed at: 08/28/2024 1021   Troponin 0 Filed at: 08/28/2024 1021   HEART Score 2 Filed at: 08/28/2024 1021                  PERC Rule for PE      Flowsheet Row Most Recent Value   PERC Rule for PE    Age >=50 0 Filed at: 08/28/2024 1048   HR >=100 1 Filed at: 08/28/2024 1048   O2 Sat on room air < 95% 0 Filed at: 08/28/2024 1048   History of PE or DVT 0 Filed at: 08/28/2024 1048   Recent trauma or surgery 0 Filed at: 08/28/2024 1048   Hemoptysis 0 Filed at: 08/28/2024 1048   Exogenous estrogen 0 Filed at: 08/28/2024 1048   Unilateral leg swelling 0 Filed at: 08/28/2024 1048   PERC Rule for PE Results 1 Filed at: 08/28/2024 1048                SBIRT 22yo+      Flowsheet Row Most Recent Value   Initial Alcohol Screen: US AUDIT-C     1. How often do you have a drink containing alcohol? 0  Filed at: 08/28/2024 0706   2. How many drinks containing alcohol do you have on a typical day you are drinking?  0 Filed at: 08/28/2024 0706   3a. Male UNDER 65: How often do you have five or more drinks on one occasion? 0 Filed at: 08/28/2024 0706   3b. FEMALE Any Age, or MALE 65+: How often do you have 4 or more drinks on one occassion? 0 Filed at: 08/28/2024 0706   Audit-C Score 0 Filed at: 08/28/2024 0706   FANTASMA: How many times in the past year have you...    Used an illegal drug or used a prescription medication for non-medical reasons? Never Filed at: 08/28/2024 0706            Wells' Criteria for PE      Flowsheet Row Most Recent Value   Wells' Criteria for PE    Clinical signs and symptoms of DVT 0 Filed at: 08/28/2024 1048   PE is primary diagnosis or equally likely 0 Filed at: 08/28/2024 1048   HR >100 1.5 Filed at: 08/28/2024 1048   Immobilization at least 3 days or Surgery in the previous 4 weeks 0 Filed at: 08/28/2024 1048   Previous, objectively diagnosed PE or DVT 0 Filed at: 08/28/2024 1048   Hemoptysis 0 Filed at: 08/28/2024 1048   Malignancy with treatment within 6 months or palliative 0 Filed at: 08/28/2024 1048   Wells' Criteria Total 1.5 Filed at: 08/28/2024 1048                  Medical Decision Making  49-year-old male, poor historian, presenting for evaluation of headache and chest pain.  No red flag signs or symptoms regarding patient's headache.  Differential diagnoses include but not limited to intracranial hemorrhage, intracranial mass, ACS, viral infection, electrolyte abnormality, PE, pneumonia, pneumothorax.  Labs overall unremarkable for patient.  Viral panel negative.  CT head unremarkable.  Chest x-ray unremarkable.  Patient was treated symptomatically with improvement in his symptoms.  He is otherwise stable for discharge at this time.  Advised follow-up with PCP.  Return precautions discussed.    Problems Addressed:  Chest pain, unspecified type: acute illness or  injury  Headache: acute illness or injury    Amount and/or Complexity of Data Reviewed  Labs: ordered. Decision-making details documented in ED Course.  Radiology: ordered.  ECG/medicine tests: ordered and independent interpretation performed. Decision-making details documented in ED Course.    Risk  OTC drugs.  Prescription drug management.                 Disposition  Final diagnoses:   Chest pain, unspecified type   Headache     Time reflects when diagnosis was documented in both MDM as applicable and the Disposition within this note       Time User Action Codes Description Comment    8/28/2024 10:21 AM Parisa Guzman [R07.9] Chest pain, unspecified type     8/28/2024 10:21 AM Parisa Guzman [R51.9] Headache           ED Disposition       ED Disposition   Discharge    Condition   Stable    Date/Time   Wed Aug 28, 2024 1020    Comment   Galindo Mensahquez discharge to home/self care.                   Follow-up Information    None         Discharge Medication List as of 8/28/2024 10:21 AM        CONTINUE these medications which have NOT CHANGED    Details   acetaminophen (TYLENOL) 325 mg tablet Take 2 tablets (650 mg total) by mouth every 6 (six) hours as needed for mild pain, Starting Wed 11/16/2022, Normal      metFORMIN (GLUCOPHAGE) 500 mg tablet Take 1 tablet (500 mg total) by mouth 2 (two) times a day with meals, Starting Thu 8/10/2023, Until Sat 9/9/2023, Normal      pantoprazole (PROTONIX) 40 mg tablet Take 1 tablet (40 mg total) by mouth daily, Starting Wed 11/16/2022, Until Fri 12/16/2022, Normal      sucralfate (CARAFATE) 1 g/10 mL suspension Take 10 mL (1 g total) by mouth 4 (four) times a day for 14 days, Starting Wed 11/16/2022, Until Wed 11/30/2022, Normal             No discharge procedures on file.    PDMP Review       None            ED Provider  Electronically Signed by             Parisa Guzman MD  08/28/24 5175

## 2024-08-28 NOTE — DISCHARGE INSTRUCTIONS
Follow-up with your primary care physician as soon as possible.  You can take Tylenol and Motrin every 6 hours as needed for headache.  Please return to the emergency department if you develop worsening symptoms, severe pain, difficulty breathing, weakness, numbness, or anything else concerning to you.    Carla un seguimiento con mace médico de atención primaria lo antes posible.  Puede clementina Tylenol y Motrin cada 6 horas según sea necesario para el dolor de mabel.  Regrese al departamento de emergencias si sherri síntomas empeoran, dolor intenso, dificultad para respirar, debilidad, entumecimiento o cualquier otra cosa que le preocupe.

## 2024-08-29 LAB
ATRIAL RATE: 108 BPM
P AXIS: 48 DEGREES
PR INTERVAL: 164 MS
QRS AXIS: 14 DEGREES
QRSD INTERVAL: 72 MS
QT INTERVAL: 324 MS
QTC INTERVAL: 434 MS
T WAVE AXIS: 16 DEGREES
VENTRICULAR RATE: 108 BPM

## 2024-08-29 PROCEDURE — 93010 ELECTROCARDIOGRAM REPORT: CPT | Performed by: INTERNAL MEDICINE

## 2024-09-03 ENCOUNTER — HOSPITAL ENCOUNTER (INPATIENT)
Facility: HOSPITAL | Age: 49
LOS: 3 days | Discharge: HOME | DRG: 243 | End: 2024-09-07
Attending: EMERGENCY MEDICINE
Payer: COMMERCIAL

## 2024-09-03 ENCOUNTER — APPOINTMENT (EMERGENCY)
Dept: RADIOLOGY | Facility: HOSPITAL | Age: 49
DRG: 243 | End: 2024-09-03
Payer: COMMERCIAL

## 2024-09-03 DIAGNOSIS — R10.9 ABDOMINAL PAIN: ICD-10-CM

## 2024-09-03 DIAGNOSIS — E11.9 NEW ONSET TYPE 2 DIABETES MELLITUS (HCC): ICD-10-CM

## 2024-09-03 DIAGNOSIS — K56.609 SBO (SMALL BOWEL OBSTRUCTION) (HCC): Primary | ICD-10-CM

## 2024-09-03 DIAGNOSIS — R79.89 ELEVATED LACTIC ACID LEVEL: ICD-10-CM

## 2024-09-03 DIAGNOSIS — R13.10 DYSPHAGIA, UNSPECIFIED TYPE: ICD-10-CM

## 2024-09-03 PROBLEM — R10.13 EPIGASTRIC PAIN: Status: ACTIVE | Noted: 2024-09-03

## 2024-09-03 LAB
ALBUMIN SERPL BCG-MCNC: 4.2 G/DL (ref 3.5–5)
ALP SERPL-CCNC: 124 U/L (ref 34–104)
ALT SERPL W P-5'-P-CCNC: 62 U/L (ref 7–52)
ANION GAP SERPL CALCULATED.3IONS-SCNC: 11 MMOL/L (ref 4–13)
APTT PPP: 30 SECONDS (ref 23–34)
AST SERPL W P-5'-P-CCNC: 24 U/L (ref 13–39)
BASOPHILS # BLD AUTO: 0.02 THOUSANDS/ÂΜL (ref 0–0.1)
BASOPHILS NFR BLD AUTO: 0 % (ref 0–1)
BILIRUB SERPL-MCNC: 0.53 MG/DL (ref 0.2–1)
BUN SERPL-MCNC: 15 MG/DL (ref 5–25)
CALCIUM SERPL-MCNC: 10 MG/DL (ref 8.4–10.2)
CARDIAC TROPONIN I PNL SERPL HS: <2 NG/L
CHLORIDE SERPL-SCNC: 99 MMOL/L (ref 96–108)
CO2 SERPL-SCNC: 24 MMOL/L (ref 21–32)
CREAT SERPL-MCNC: 0.8 MG/DL (ref 0.6–1.3)
EOSINOPHIL # BLD AUTO: 0.29 THOUSAND/ÂΜL (ref 0–0.61)
EOSINOPHIL NFR BLD AUTO: 3 % (ref 0–6)
ERYTHROCYTE [DISTWIDTH] IN BLOOD BY AUTOMATED COUNT: 15.1 % (ref 11.6–15.1)
GFR SERPL CREATININE-BSD FRML MDRD: 106 ML/MIN/1.73SQ M
GLUCOSE SERPL-MCNC: 147 MG/DL (ref 65–140)
GLUCOSE SERPL-MCNC: 149 MG/DL (ref 65–140)
GLUCOSE SERPL-MCNC: 156 MG/DL (ref 65–140)
HCT VFR BLD AUTO: 46 % (ref 36.5–49.3)
HGB BLD-MCNC: 15.2 G/DL (ref 12–17)
IMM GRANULOCYTES # BLD AUTO: 0.05 THOUSAND/UL (ref 0–0.2)
IMM GRANULOCYTES NFR BLD AUTO: 1 % (ref 0–2)
INR PPP: 0.95 (ref 0.85–1.19)
LACTATE SERPL-SCNC: 2.2 MMOL/L (ref 0.5–2)
LACTATE SERPL-SCNC: 2.5 MMOL/L (ref 0.5–2)
LIPASE SERPL-CCNC: 21 U/L (ref 11–82)
LYMPHOCYTES # BLD AUTO: 1.65 THOUSANDS/ÂΜL (ref 0.6–4.47)
LYMPHOCYTES NFR BLD AUTO: 19 % (ref 14–44)
MCH RBC QN AUTO: 26.3 PG (ref 26.8–34.3)
MCHC RBC AUTO-ENTMCNC: 33 G/DL (ref 31.4–37.4)
MCV RBC AUTO: 80 FL (ref 82–98)
MONOCYTES # BLD AUTO: 0.6 THOUSAND/ÂΜL (ref 0.17–1.22)
MONOCYTES NFR BLD AUTO: 7 % (ref 4–12)
NEUTROPHILS # BLD AUTO: 6.33 THOUSANDS/ÂΜL (ref 1.85–7.62)
NEUTS SEG NFR BLD AUTO: 70 % (ref 43–75)
NRBC BLD AUTO-RTO: 0 /100 WBCS
PLATELET # BLD AUTO: 392 THOUSANDS/UL (ref 149–390)
PMV BLD AUTO: 9.3 FL (ref 8.9–12.7)
POTASSIUM SERPL-SCNC: 4 MMOL/L (ref 3.5–5.3)
PROCALCITONIN SERPL-MCNC: 0.07 NG/ML
PROT SERPL-MCNC: 8.4 G/DL (ref 6.4–8.4)
PROTHROMBIN TIME: 13.1 SECONDS (ref 12.3–15)
RBC # BLD AUTO: 5.77 MILLION/UL (ref 3.88–5.62)
SODIUM SERPL-SCNC: 134 MMOL/L (ref 135–147)
WBC # BLD AUTO: 8.94 THOUSAND/UL (ref 4.31–10.16)

## 2024-09-03 PROCEDURE — 99284 EMERGENCY DEPT VISIT MOD MDM: CPT

## 2024-09-03 PROCEDURE — 83605 ASSAY OF LACTIC ACID: CPT | Performed by: EMERGENCY MEDICINE

## 2024-09-03 PROCEDURE — 85610 PROTHROMBIN TIME: CPT | Performed by: EMERGENCY MEDICINE

## 2024-09-03 PROCEDURE — 83036 HEMOGLOBIN GLYCOSYLATED A1C: CPT | Performed by: EMERGENCY MEDICINE

## 2024-09-03 PROCEDURE — 84484 ASSAY OF TROPONIN QUANT: CPT

## 2024-09-03 PROCEDURE — 80053 COMPREHEN METABOLIC PANEL: CPT | Performed by: EMERGENCY MEDICINE

## 2024-09-03 PROCEDURE — 84145 PROCALCITONIN (PCT): CPT | Performed by: EMERGENCY MEDICINE

## 2024-09-03 PROCEDURE — 96360 HYDRATION IV INFUSION INIT: CPT

## 2024-09-03 PROCEDURE — 85025 COMPLETE CBC W/AUTO DIFF WBC: CPT | Performed by: EMERGENCY MEDICINE

## 2024-09-03 PROCEDURE — 82948 REAGENT STRIP/BLOOD GLUCOSE: CPT

## 2024-09-03 PROCEDURE — 84484 ASSAY OF TROPONIN QUANT: CPT | Performed by: EMERGENCY MEDICINE

## 2024-09-03 PROCEDURE — 87040 BLOOD CULTURE FOR BACTERIA: CPT | Performed by: EMERGENCY MEDICINE

## 2024-09-03 PROCEDURE — 99204 OFFICE O/P NEW MOD 45 MIN: CPT | Performed by: SPECIALIST

## 2024-09-03 PROCEDURE — 83690 ASSAY OF LIPASE: CPT | Performed by: EMERGENCY MEDICINE

## 2024-09-03 PROCEDURE — 99285 EMERGENCY DEPT VISIT HI MDM: CPT | Performed by: EMERGENCY MEDICINE

## 2024-09-03 PROCEDURE — 74174 CTA ABD&PLVS W/CONTRAST: CPT

## 2024-09-03 PROCEDURE — 93005 ELECTROCARDIOGRAM TRACING: CPT

## 2024-09-03 PROCEDURE — 36415 COLL VENOUS BLD VENIPUNCTURE: CPT | Performed by: EMERGENCY MEDICINE

## 2024-09-03 PROCEDURE — 71275 CT ANGIOGRAPHY CHEST: CPT

## 2024-09-03 PROCEDURE — 85730 THROMBOPLASTIN TIME PARTIAL: CPT | Performed by: EMERGENCY MEDICINE

## 2024-09-03 PROCEDURE — 99222 1ST HOSP IP/OBS MODERATE 55: CPT

## 2024-09-03 RX ORDER — PANTOPRAZOLE SODIUM 40 MG/10ML
40 INJECTION, POWDER, LYOPHILIZED, FOR SOLUTION INTRAVENOUS
Status: DISCONTINUED | OUTPATIENT
Start: 2024-09-03 | End: 2024-09-03

## 2024-09-03 RX ORDER — PANTOPRAZOLE SODIUM 40 MG/10ML
40 INJECTION, POWDER, LYOPHILIZED, FOR SOLUTION INTRAVENOUS EVERY 12 HOURS SCHEDULED
Status: DISCONTINUED | OUTPATIENT
Start: 2024-09-03 | End: 2024-09-07 | Stop reason: HOSPADM

## 2024-09-03 RX ORDER — ENOXAPARIN SODIUM 100 MG/ML
40 INJECTION SUBCUTANEOUS
Status: DISCONTINUED | OUTPATIENT
Start: 2024-09-04 | End: 2024-09-07 | Stop reason: HOSPADM

## 2024-09-03 RX ORDER — SUCRALFATE 1 G/1
1 TABLET ORAL
Status: DISCONTINUED | OUTPATIENT
Start: 2024-09-03 | End: 2024-09-03

## 2024-09-03 RX ORDER — SODIUM CHLORIDE 9 MG/ML
75 INJECTION, SOLUTION INTRAVENOUS CONTINUOUS
Status: DISCONTINUED | OUTPATIENT
Start: 2024-09-03 | End: 2024-09-07 | Stop reason: HOSPADM

## 2024-09-03 RX ORDER — INSULIN LISPRO 100 [IU]/ML
1-6 INJECTION, SOLUTION INTRAVENOUS; SUBCUTANEOUS EVERY 6 HOURS SCHEDULED
Status: DISCONTINUED | OUTPATIENT
Start: 2024-09-03 | End: 2024-09-05

## 2024-09-03 RX ORDER — ONDANSETRON 2 MG/ML
4 INJECTION INTRAMUSCULAR; INTRAVENOUS EVERY 8 HOURS PRN
Status: DISCONTINUED | OUTPATIENT
Start: 2024-09-03 | End: 2024-09-07 | Stop reason: HOSPADM

## 2024-09-03 RX ADMIN — IOHEXOL 100 ML: 350 INJECTION, SOLUTION INTRAVENOUS at 14:56

## 2024-09-03 RX ADMIN — SODIUM CHLORIDE 75 ML/HR: 0.9 INJECTION, SOLUTION INTRAVENOUS at 19:11

## 2024-09-03 RX ADMIN — SODIUM CHLORIDE 1000 ML: 0.9 INJECTION, SOLUTION INTRAVENOUS at 18:15

## 2024-09-03 RX ADMIN — PANTOPRAZOLE SODIUM 40 MG: 40 INJECTION, POWDER, FOR SOLUTION INTRAVENOUS at 19:14

## 2024-09-03 RX ADMIN — SODIUM CHLORIDE 1000 ML: 0.9 INJECTION, SOLUTION INTRAVENOUS at 14:38

## 2024-09-03 NOTE — ED NOTES
Surgery provider at pt bedside evaluating pt . Provider push back NGT to 75 cm external from 63 cm.      Meseret Johnson, MAYI  09/03/24 0982

## 2024-09-03 NOTE — ASSESSMENT & PLAN NOTE
This is a chronic condition however with recent progression  Could be related to bowel obstruction per CT scan findings.      -Troponin negative x 1.  -EKG with no acute changes per ED physician interpretation      Will check new sets of troponins  EKG every 12  Interval follow-up
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
## 2024-09-03 NOTE — ASSESSMENT & PLAN NOTE
CTA: No evidence of thoracic aortic dissection  No evidence of pulmonary embolism     Partial/early small bowel obstruction with the mildly distended loops of the small bowel in the upper to mid abdomen with normal caliber ileal loops.. Etiology is unclear      Refer to assessment and plan for abdominal pain

## 2024-09-03 NOTE — ASSESSMENT & PLAN NOTE
Patient presented to ED with chest pain/abdominal pain, pointing to epigastric area. Seen in [place] ED 8/28 for chest pain with negative workup at that time. Patient reports symptoms persisted along with acid reflux as well.  CTA c/a/p: Partial/early small bowel obstruction with the mildly distended loops of the small bowel in the upper to mid abdomen with normal caliber ileal loops. Etiology is unclear.  Troponins negative, EKG showed NSR  Lipase negative  General surgery consulted  NG tube in place  NPO  Gastrografin challenge  Monitor abdominal exam  Continue IV protonix

## 2024-09-03 NOTE — Clinical Note
Case was discussed with surgery and the patient's admission status was agreed to be Admission Status: observation status to the service of Dr. Ambriz.

## 2024-09-03 NOTE — ASSESSMENT & PLAN NOTE
Most probably secondary to poor diet with possible GERD  Also with bowel obstruction per CT scan finding        Case was discussed with general surgery team we agreed on the following plan;  NG tube in place  Keep n.p.o.  Protonix IV twice daily  Consider GI consult  General surgery team will continue to follow patient   Interval follow-up  Patient manage follow-up CT scanwith p.o. contrast per general surgery team recommendations should symptoms progress or persist over next 24 hours  General Surgery consult

## 2024-09-03 NOTE — H&P
"[facility]  H&P  Name: Galindo Patiño 49 y.o. male I MRN: [id number]  Unit/Bed#: ED CT1 I Date of Admission: 9/3/2024   Date of Service: 9/3/2024 I Hospital Day: 0      Assessment & Plan   Elevated lactic acid level  Assessment & Plan  Continue IVF   Follow up accordingly    Epigastric pain  Assessment & Plan  This is a chronic condition however with recent progression  Could be related to bowel obstruction per CT scan findings.      -Troponin negative x 1.  -EKG with no acute changes per ED physician interpretation      Will check new sets of troponins  EKG every 12  Interval follow-up    Type 2 diabetes mellitus (HCC)  Assessment & Plan  Lab Results   Component Value Date    HGBA1C 12.9 (H) 05/06/2024       No results for input(s): \"POCGLU\" in the last 72 hours.    Blood Sugar Average: Last 72 hrs:  Check HbA1c  With Accu-Cheks  Hold metformin      Bowel obstruction (HCC)  Assessment & Plan  CTA: No evidence of thoracic aortic dissection  No evidence of pulmonary embolism     Partial/early small bowel obstruction with the mildly distended loops of the small bowel in the upper to mid abdomen with normal caliber ileal loops.. Etiology is unclear      Refer to assessment and plan for abdominal pain    * Acute abdominal pain  Assessment & Plan  Most probably secondary to poor diet with possible GERD  Also with bowel obstruction per CT scan finding        Case was discussed with general surgery team we agreed on the following plan;  NG tube in place  Keep n.p.o.  Protonix IV twice daily  Consider GI consult  General surgery team will continue to follow patient   Interval follow-up  Patient manage follow-up CT scanwith p.o. contrast per general surgery team recommendations should symptoms progress or persist over next 24 hours  General Surgery consult           VTE Pharmacologic Prophylaxis: VTE Score: 2 Low Risk (Score 0-2) - Encourage Ambulation.  Code Status: Prior   Discussion with family:  " patient .     Anticipated Length of Stay: Patient will be admitted on an observation basis with an anticipated length of stay of less than 2 midnights secondary to acute abdominal pain .    Total Time Spent on Date of Encounter in care of patient: 45 mins. This time was spent on one or more of the following: performing physical exam; counseling and coordination of care; obtaining or reviewing history; documenting in the medical record; reviewing/ordering tests, medications or procedures; communicating with other healthcare professionals and discussing with patient's family/caregivers.    Chief Complaint: Abdominal pain for the past 1 day.    History of Present Illness:  Galindo Patiño is a 49 y.o. male with a PMH of type 2 diabetes, medication noncompliance who presents with abdominal pain for the past 1 day.  The pain is generalized, moderate to severe, sharp character, associated with nausea.  No diarrhea or constipation.  Last bowel movement yesterday.  Last flatus yesterday.  No history of similar problem per the patient.  No fevers or chills.  He reports chest discomfort substernal lower chest around the epigastric area.   Associated with reflux-like symptoms.  Denies shortness of breath.      Review of Systems:  Review of Systems   Constitutional:  Positive for activity change. Negative for chills, fatigue and fever.   HENT: Negative.  Negative for hearing loss.    Eyes: Negative.  Negative for visual disturbance.   Respiratory:  Negative for shortness of breath and wheezing.    Cardiovascular:  Negative for chest pain and palpitations.   Gastrointestinal:  Positive for abdominal pain and nausea. Negative for blood in stool, constipation, diarrhea and vomiting.   Endocrine: Negative.    Genitourinary:  Negative for difficulty urinating and dysuria.   Musculoskeletal:  Negative for arthralgias and myalgias.   Skin: Negative.    Allergic/Immunologic: Negative.    Neurological:  Negative for seizures and syncope.    Hematological:  Negative for adenopathy.   Psychiatric/Behavioral: Negative.         Past Medical and Surgical History:   No past medical history on file.    No past surgical history on file.    Meds/Allergies:  Prior to Admission medications    Medication Sig Start Date End Date Taking? Authorizing Provider   metFORMIN (GLUCOPHAGE) 500 mg tablet Take 1 tablet (500 mg total) by mouth 2 (two) times a day with meals 8/10/23 9/9/23  Rosa M Mcgrath MD   pantoprazole (PROTONIX) 40 mg tablet Take 1 tablet (40 mg total) by mouth daily 11/16/22 12/16/22  Rosangela Dotson PA-C   sucralfate (CARAFATE) 1 g/10 mL suspension Take 10 mL (1 g total) by mouth 4 (four) times a day for 14 days 11/16/22 11/30/22  Rosangela Dotson PA-C   acetaminophen (TYLENOL) 325 mg tablet Take 2 tablets (650 mg total) by mouth every 6 (six) hours as needed for mild pain  Patient not taking: Reported on 7/11/2023 11/16/22 9/3/24  Rosangela Dotson PA-C     I have reviewed home medications with patient personally.    Allergies:   Allergies   Allergen Reactions    Shellfish-Derived Products - Food Allergy Rash       Social History:  Marital Status: Single   Occupation: none   Patient Pre-hospital Living Situation: Home  Patient Pre-hospital Level of Mobility: walks  Patient Pre-hospital Diet Restrictions: none   Substance Use History:   Social History     Substance and Sexual Activity   Alcohol Use Not Currently    Comment: socially     Social History     Tobacco Use   Smoking Status Every Day    Current packs/day: 1.00    Average packs/day: 1 pack/day for 23.0 years (23.0 ttl pk-yrs)    Types: Cigarettes   Smokeless Tobacco Never     Social History     Substance and Sexual Activity   Drug Use No       Family History:  No family history on file.    Physical Exam:     Vitals:   Blood Pressure: 138/83 (09/03/24 1800)  Pulse: 85 (09/03/24 1800)  Temperature: (!) 97 °F (36.1 °C) (09/03/24 1431)  Temp Source: Oral (09/03/24 1431)  Respirations: 21  (09/03/24 1800)  SpO2: 96 % (09/03/24 1800)    Physical Exam  Vitals and nursing note reviewed.   Constitutional:       General: He is not in acute distress.     Appearance: He is obese. He is ill-appearing.   HENT:      Head: Normocephalic and atraumatic.      Mouth/Throat:      Mouth: Mucous membranes are moist.   Eyes:      Conjunctiva/sclera: Conjunctivae normal.      Pupils: Pupils are equal, round, and reactive to light.   Cardiovascular:      Rate and Rhythm: Normal rate and regular rhythm.      Pulses: Normal pulses.           Carotid pulses are 2+ on the right side and 2+ on the left side.       Radial pulses are 2+ on the right side and 2+ on the left side.        Dorsalis pedis pulses are 2+ on the right side and 2+ on the left side.      Heart sounds: Normal heart sounds, S1 normal and S2 normal. No murmur heard.  Pulmonary:      Effort: No tachypnea, bradypnea or accessory muscle usage.      Breath sounds: Normal breath sounds and air entry. No decreased breath sounds, wheezing, rhonchi or rales.   Abdominal:      General: Abdomen is flat. Bowel sounds are decreased.      Palpations: Abdomen is soft.      Tenderness: There is generalized abdominal tenderness. There is rebound.   Musculoskeletal:      Right lower leg: No edema.      Left lower leg: No edema.   Skin:     General: Skin is warm.      Capillary Refill: Capillary refill takes less than 2 seconds.   Neurological:      Mental Status: He is alert and oriented to person, place, and time. Mental status is at baseline.   Psychiatric:         Mood and Affect: Mood normal.         Behavior: Behavior normal.          Additional Data:     Lab Results:  Results from last 7 days   Lab Units 09/03/24  1438   WBC Thousand/uL 8.94   HEMOGLOBIN g/dL 15.2   HEMATOCRIT % 46.0   PLATELETS Thousands/uL 392*   SEGS PCT % 70   LYMPHO PCT % 19   MONO PCT % 7   EOS PCT % 3     Results from last 7 days   Lab Units 09/03/24  1438   SODIUM mmol/L 134*   POTASSIUM  mmol/L 4.0   CHLORIDE mmol/L 99   CO2 mmol/L 24   BUN mg/dL 15   CREATININE mg/dL 0.80   ANION GAP mmol/L 11   CALCIUM mg/dL 10.0   ALBUMIN g/dL 4.2   TOTAL BILIRUBIN mg/dL 0.53   ALK PHOS U/L 124*   ALT U/L 62*   AST U/L 24   GLUCOSE RANDOM mg/dL 156*     Results from last 7 days   Lab Units 09/03/24  1438   INR  0.95         Lab Results   Component Value Date    HGBA1C 12.9 (H) 05/06/2024    HGBA1C 6.0 (H) 11/14/2022     Results from last 7 days   Lab Units 09/03/24  1720 09/03/24  1438   LACTIC ACID mmol/L 2.2* 2.5*   PROCALCITONIN ng/ml  --  0.07       Lines/Drains:  Invasive Devices       Peripheral Intravenous Line  Duration             Peripheral IV 09/03/24 Antecubital <1 day    Peripheral IV 09/03/24 Right;Ventral (anterior) Wrist <1 day              Drain  Duration             NG/OG/Enteral Tube Nasogastric 16 Fr Right nare <1 day                        Imaging: Reviewed radiology reports from this admission including: abdominal/pelvic CT  CTA dissection protocol chest/abdomen/pelvis   Final Result by Johnna Walden MD (09/03 1622)      No evidence of thoracic aortic dissection   No evidence of pulmonary embolism      Partial/early small bowel obstruction with the mildly distended loops of the small bowel in the upper to mid abdomen with normal caliber ileal loops.. Etiology is unclear      Incidentally noted is a enlarged lower right paratracheal lymph node measuring 2 cm. This can be assessed with the PET scan performed on nonemergent basis      Nonspecific 3 mm left upper lobe lung nodule, image 35 series 605. Based on current Fleischner Society 2017 Guidelines on incidental pulmonary nodule, no routine follow-up is needed if the patient is low risk. If the patient is high risk, optional    follow-up chest CT at 12 months can be considered.         The study was marked in EPIC for immediate notification.         Workstation performed: [id number]             EKG and Other Studies Reviewed on  Admission:   EKG: No EKG obtained. ED obtained EKG. Conveyed results in ED. No acute changes per ED physician     ** Please Note: This note has been constructed using a voice recognition system. **

## 2024-09-03 NOTE — ASSESSMENT & PLAN NOTE
"Lab Results   Component Value Date    HGBA1C 12.9 (H) 05/06/2024       No results for input(s): \"POCGLU\" in the last 72 hours.    Blood Sugar Average: Last 72 hrs:  Check HbA1c  With Accu-Cheks  Hold metformin    "

## 2024-09-03 NOTE — ED PROVIDER NOTES
History  Chief Complaint   Patient presents with    Abdominal Pain     PT reports RUQ abdominal pain, tender upon palpation. 100mcg fentanyl, 324 asprin, 4mg zofan given  in route.     Pt is a 48yo M who presents for abdominal and chest pain.  Patient brought in by EMS.  Per EMS, patient has been reporting chest pain but points to the epigastric area of his stomach.  Patient was given fentanyl during transport with no improvement.  Patient overall poor historian and did not provide much more history.  Per chart review, patient was seen at [facility] several days ago and had a chest pain evaluation that was unremarkable.  Patient reporting that his pain has continued and has worsened particularly today.  Patient writhing in pain and does not give further history.    Pt is Armenian speaking only and  used throughout encounter.           Prior to Admission Medications   Prescriptions Last Dose Informant Patient Reported? Taking?   acetaminophen (TYLENOL) 325 mg tablet   No No   Sig: Take 2 tablets (650 mg total) by mouth every 6 (six) hours as needed for mild pain   Patient not taking: Reported on 7/11/2023   metFORMIN (GLUCOPHAGE) 500 mg tablet   No No   Sig: Take 1 tablet (500 mg total) by mouth 2 (two) times a day with meals   pantoprazole (PROTONIX) 40 mg tablet   No No   Sig: Take 1 tablet (40 mg total) by mouth daily   sucralfate (CARAFATE) 1 g/10 mL suspension   No No   Sig: Take 10 mL (1 g total) by mouth 4 (four) times a day for 14 days      Facility-Administered Medications: None       No past medical history on file.    No past surgical history on file.    No family history on file.  I have reviewed and agree with the history as documented.    E-Cigarette/Vaping    E-Cigarette Use Never User      E-Cigarette/Vaping Substances    Nicotine No     THC No     CBD No     Flavoring No     Other No     Unknown No      Social History     Tobacco Use    Smoking status: Every Day     Current packs/day: 1.00      Average packs/day: 1 pack/day for 23.0 years (23.0 ttl pk-yrs)     Types: Cigarettes    Smokeless tobacco: Never   Vaping Use    Vaping status: Never Used   Substance Use Topics    Alcohol use: Not Currently     Comment: socially    Drug use: No     Physical Exam  Physical Exam  Vitals reviewed.   Constitutional:       General: He is in acute distress.      Appearance: He is well-developed. He is not diaphoretic.   HENT:      Head: Normocephalic and atraumatic.      Right Ear: External ear normal.      Left Ear: External ear normal.      Nose: Nose normal.      Mouth/Throat:      Pharynx: Oropharynx is clear.   Eyes:      Extraocular Movements: Extraocular movements intact.      Pupils: Pupils are equal, round, and reactive to light.   Cardiovascular:      Rate and Rhythm: Normal rate and regular rhythm.      Heart sounds: Normal heart sounds. No murmur heard.  Pulmonary:      Effort: Pulmonary effort is normal. No respiratory distress.      Breath sounds: Normal breath sounds.   Abdominal:      General: Bowel sounds are normal.      Palpations: Abdomen is soft.      Tenderness: There is abdominal tenderness (diffuse). There is guarding.   Musculoskeletal:         General: Normal range of motion.      Cervical back: Normal range of motion and neck supple.      Right lower leg: No edema.      Left lower leg: No edema.   Skin:     General: Skin is warm and dry.      Capillary Refill: Capillary refill takes less than 2 seconds.      Coloration: Skin is not pale.      Findings: No erythema or rash.   Neurological:      General: No focal deficit present.      Mental Status: He is alert.   Psychiatric:         Speech: Speech normal.         Behavior: Behavior is cooperative.         Vital Signs  ED Triage Vitals   Temperature Pulse Respirations Blood Pressure SpO2   09/03/24 1431 09/03/24 1431 09/03/24 1431 09/03/24 1431 09/03/24 1431   (!) 97 °F (36.1 °C) 81 20 (!) 88/70 98 %      Temp Source Heart Rate Source Patient  Position - Orthostatic VS BP Location FiO2 (%)   09/03/24 1431 09/03/24 1530 09/03/24 1530 09/03/24 1530 --   Oral Monitor Lying Left arm       Pain Score       09/03/24 1530       10 - Worst Possible Pain           Vitals:    09/03/24 1503 09/03/24 1504 09/03/24 1530 09/03/24 1600   BP: 100/60 115/74 94/66 97/60   Pulse:  70 82 83   Patient Position - Orthostatic VS:   Lying Lying         Visual Acuity      ED Medications  Medications   sodium chloride 0.9 % bolus 1,000 mL (has no administration in time range)   sodium chloride 0.9 % bolus 1,000 mL (0 mL Intravenous Stopped 9/3/24 1539)   iohexol (OMNIPAQUE) 350 MG/ML injection (MULTI-DOSE) 100 mL (100 mL Intravenous Given 9/3/24 1456)       Diagnostic Studies  Results Reviewed       Procedure Component Value Units Date/Time    Lactic acid 2 Hours [[id number]] Collected: 09/03/24 1720    Lab Status: No result Specimen: Blood from Arm, Right     Hemoglobin A1C [[id number]] Collected: 09/03/24 1438    Lab Status: In process Specimen: Blood from Line, Venous Updated: 09/03/24 1719    Rapid drug screen, urine [[id number]]     Lab Status: No result Specimen: Urine     Procalcitonin [[id number]]  (Normal) Collected: 09/03/24 1438    Lab Status: Final result Specimen: Blood from Line, Venous Updated: 09/03/24 1521     Procalcitonin 0.07 ng/ml     Blood culture [[id number]] Collected: 09/03/24 1515    Lab Status: In process Specimen: Blood from Arm, Right Updated: 09/03/24 1519    Comprehensive metabolic panel [[id number]]  (Abnormal) Collected: 09/03/24 1438    Lab Status: Final result Specimen: Blood from Line, Venous Updated: 09/03/24 1510     Sodium 134 mmol/L      Potassium 4.0 mmol/L      Chloride 99 mmol/L      CO2 24 mmol/L      ANION GAP 11 mmol/L      BUN 15 mg/dL      Creatinine 0.80 mg/dL      Glucose 156 mg/dL      Calcium 10.0 mg/dL      AST 24 U/L      ALT 62 U/L      Alkaline Phosphatase 124 U/L      Total Protein 8.4 g/dL      Albumin 4.2 g/dL      Total  Bilirubin 0.53 mg/dL      eGFR 106 ml/min/1.73sq m     Narrative:      National Kidney Disease Foundation guidelines for Chronic Kidney Disease (CKD):     Stage 1 with normal or high GFR (GFR > 90 mL/min/1.73 square meters)    Stage 2 Mild CKD (GFR = 60-89 mL/min/1.73 square meters)    Stage 3A Moderate CKD (GFR = 45-59 mL/min/1.73 square meters)    Stage 3B Moderate CKD (GFR = 30-44 mL/min/1.73 square meters)    Stage 4 Severe CKD (GFR = 15-29 mL/min/1.73 square meters)    Stage 5 End Stage CKD (GFR <15 mL/min/1.73 square meters)  Note: GFR calculation is accurate only with a steady state creatinine    Lipase [[id number]]  (Normal) Collected: 09/03/24 1438    Lab Status: Final result Specimen: Blood from Line, Venous Updated: 09/03/24 1510     Lipase 21 u/L     Lactic acid, plasma (w/reflex if result > 2.0) [[id number]]  (Abnormal) Collected: 09/03/24 1438    Lab Status: Final result Specimen: Blood from Line, Venous Updated: 09/03/24 1509     LACTIC ACID 2.5 mmol/L     Narrative:      Result may be elevated if tourniquet was used during collection.    HS Troponin 0hr (reflex protocol) [[id number]]  (Normal) Collected: 09/03/24 1438    Lab Status: Final result Specimen: Blood from Line, Venous Updated: 09/03/24 1507     hs TnI 0hr <2 ng/L     Protime-INR [[id number]]  (Normal) Collected: 09/03/24 1438    Lab Status: Final result Specimen: Blood from Line, Venous Updated: 09/03/24 1506     Protime 13.1 seconds      INR 0.95    Narrative:      INR Therapeutic Range    Indication                                             INR Range      Atrial Fibrillation                                               2.0-3.0  Hypercoagulable State                                    2.0.2.3  Left Ventricular Asist Device                            2.0-3.0  Mechanical Heart Valve                                  -    Aortic(with afib, MI, embolism, HF, LA enlargement,    and/or coagulopathy)                                     2.0-3.0  (2.5-3.5)     Mitral                                                             2.5-3.5  Prosthetic/Bioprosthetic Heart Valve               2.0-3.0  Venous thromboembolism (VTE: VT, PE        2.0-3.0    APTT [[id number]]  (Normal) Collected: 09/03/24 1438    Lab Status: Final result Specimen: Blood from Line, Venous Updated: 09/03/24 1506     PTT 30 seconds     CBC and differential [[id number]]  (Abnormal) Collected: 09/03/24 1438    Lab Status: Final result Specimen: Blood from Line, Venous Updated: 09/03/24 1446     WBC 8.94 Thousand/uL      RBC 5.77 Million/uL      Hemoglobin 15.2 g/dL      Hematocrit 46.0 %      MCV 80 fL      MCH 26.3 pg      MCHC 33.0 g/dL      RDW 15.1 %      MPV 9.3 fL      Platelets 392 Thousands/uL      nRBC 0 /100 WBCs      Segmented % 70 %      Immature Grans % 1 %      Lymphocytes % 19 %      Monocytes % 7 %      Eosinophils Relative 3 %      Basophils Relative 0 %      Absolute Neutrophils 6.33 Thousands/µL      Absolute Immature Grans 0.05 Thousand/uL      Absolute Lymphocytes 1.65 Thousands/µL      Absolute Monocytes 0.60 Thousand/µL      Eosinophils Absolute 0.29 Thousand/µL      Basophils Absolute 0.02 Thousands/µL                    CTA dissection protocol chest/abdomen/pelvis   Final Result by Johnna Walden MD (09/03 1622)      No evidence of thoracic aortic dissection   No evidence of pulmonary embolism      Partial/early small bowel obstruction with the mildly distended loops of the small bowel in the upper to mid abdomen with normal caliber ileal loops.. Etiology is unclear      Incidentally noted is a enlarged lower right paratracheal lymph node measuring 2 cm. This can be assessed with the PET scan performed on nonemergent basis      Nonspecific 3 mm left upper lobe lung nodule, image 35 series 605. Based on current Fleischner Society 2017 Guidelines on incidental pulmonary nodule, no routine follow-up is needed if the patient is low risk. If the patient is high risk,  optional    follow-up chest CT at 12 months can be considered.         The study was marked in EPIC for immediate notification.         Workstation performed: [id number]                    Procedures  Procedures         ED Course  ED Course as of 09/03/24 1723   Tue Sep 03, 2024   1449 CBC and differential(!)  Reviewed and without actionable derangement.    1506 Blood Pressure: 115/74  Interval improvement. Hypotension likely at least exacerbated by pain medication given during transport.    1507 PTT: 30  WNL   1507 POCT INR: 0.95  WNL   1507 hs TnI 0hr: <2  Negative. No indication for delta.    1514 ALT(!): 62  Elevated and increased from most recent prior.    1515 ALK PHOS(!): 124  Elevated and increased from most recent prior.    1515 LIPASE: 21  WNL   1515 LACTIC ACID(!): 2.5  Elevated. Fluids in process.    1524 Procalcitonin: 0.07  Negative.    1604 CT being read.    1625 CTA dissection protocol chest/abdomen/pelvis  Partial/early small bowel obstruction with the mildly distended loops of the small bowel in the upper to mid abdomen with normal caliber ileal loops.   1627 Pt reassessed and resting more comfortably. Pt made aware of all results using .    1717 Discussed with surgery who are in agreement with NG (already placed) and requesting medical admit with surgery consult. Will make SLIM aware.                                  SBIRT 22yo+      Flowsheet Row Most Recent Value   Initial Alcohol Screen: US AUDIT-C     1. How often do you have a drink containing alcohol? 0 Filed at: 09/03/2024 1433   2. How many drinks containing alcohol do you have on a typical day you are drinking?  0 Filed at: 09/03/2024 1433   3a. Male UNDER 65: How often do you have five or more drinks on one occasion? 0 Filed at: 09/03/2024 1433   3b. FEMALE Any Age, or MALE 65+: How often do you have 4 or more drinks on one occassion? 0 Filed at: 09/03/2024 1433   Audit-C Score 0 Filed at: 09/03/2024 1433   FANTASMA: How many  times in the past year have you...    Used an illegal drug or used a prescription medication for non-medical reasons? Never Filed at: 09/03/2024 1433                      Medical Decision Making  Pt is a 50yo M who presents with abdominal and chest pain.     Differential diagnosis to include but not limited to ACS, dissection, myocarditis, pericarditis, intra-abdominal abnormality.  Will plan for labs and imaging.  Patient uncomfortable appearing, however also hypotensive.  Will give fluids and monitor prior to additional pain medication administration.  See ED course for results and details.    Plan to admit pt to [facility]. Pt discussed with admitting team and admission orders placed. Pt admitted without incident.       Amount and/or Complexity of Data Reviewed  Labs: ordered. Decision-making details documented in ED Course.  Radiology: ordered. Decision-making details documented in ED Course.    Risk  Prescription drug management.  Decision regarding hospitalization.                 Disposition  Final diagnoses:   SBO (small bowel obstruction) (HCC)   Abdominal pain   Elevated lactic acid level     Time reflects when diagnosis was documented in both MDM as applicable and the Disposition within this note       Time User Action Codes Description Comment    9/3/2024  4:32 PM Christel Carl Add [K56.609] SBO (small bowel obstruction) (HCC)     9/3/2024  4:32 PM Christel Carl Add [R10.9] Abdominal pain     9/3/2024  5:20 PM Christel Carl Add [R79.89] Elevated lactic acid level           ED Disposition       ED Disposition   Admit    Condition   Stable    Date/Time   Tue Sep 3, 2024 1723    Comment   Case was discussed with [facility] and the patient's admission status was agreed to be Admission Status: observation status to the service of Dr. Ambriz.               Follow-up Information    None         Patient's Medications   Discharge Prescriptions    No medications on file       No discharge procedures on  file.    PDMP Review       None            ED Provider  Electronically Signed by             Christel Carl MD  09/03/24 9201

## 2024-09-03 NOTE — ASSESSMENT & PLAN NOTE
Lab Results   Component Value Date    HGBA1C 6.7 (H) 09/03/2024       Recent Labs     09/03/24  2242 09/04/24  0101 09/04/24  0636 09/04/24  1258   POCGLU 147* 125 128 131       Blood Sugar Average: Last 72 hrs:  (P) 136  Hold home metformin  SSI q6h while NPO  Diabetic diet when able

## 2024-09-04 ENCOUNTER — APPOINTMENT (OUTPATIENT)
Dept: RADIOLOGY | Facility: HOSPITAL | Age: 49
DRG: 243 | End: 2024-09-04
Payer: COMMERCIAL

## 2024-09-04 ENCOUNTER — APPOINTMENT (INPATIENT)
Dept: RADIOLOGY | Facility: HOSPITAL | Age: 49
DRG: 243 | End: 2024-09-04
Payer: COMMERCIAL

## 2024-09-04 PROBLEM — K21.9 GERD (GASTROESOPHAGEAL REFLUX DISEASE): Status: ACTIVE | Noted: 2024-09-04

## 2024-09-04 LAB
ALBUMIN SERPL BCG-MCNC: 3.5 G/DL (ref 3.5–5)
ALP SERPL-CCNC: 112 U/L (ref 34–104)
ALT SERPL W P-5'-P-CCNC: 49 U/L (ref 7–52)
AMPHETAMINES SERPL QL SCN: NEGATIVE
ANION GAP SERPL CALCULATED.3IONS-SCNC: 8 MMOL/L (ref 4–13)
AST SERPL W P-5'-P-CCNC: 23 U/L (ref 13–39)
BARBITURATES UR QL: NEGATIVE
BASOPHILS # BLD AUTO: 0.02 THOUSANDS/ÂΜL (ref 0–0.1)
BASOPHILS NFR BLD AUTO: 0 % (ref 0–1)
BENZODIAZ UR QL: NEGATIVE
BILIRUB SERPL-MCNC: 0.61 MG/DL (ref 0.2–1)
BUN SERPL-MCNC: 9 MG/DL (ref 5–25)
CALCIUM SERPL-MCNC: 8.6 MG/DL (ref 8.4–10.2)
CARDIAC TROPONIN I PNL SERPL HS: <2 NG/L
CHLORIDE SERPL-SCNC: 105 MMOL/L (ref 96–108)
CO2 SERPL-SCNC: 23 MMOL/L (ref 21–32)
COCAINE UR QL: NEGATIVE
CREAT SERPL-MCNC: 0.57 MG/DL (ref 0.6–1.3)
EOSINOPHIL # BLD AUTO: 0.2 THOUSAND/ÂΜL (ref 0–0.61)
EOSINOPHIL NFR BLD AUTO: 3 % (ref 0–6)
ERYTHROCYTE [DISTWIDTH] IN BLOOD BY AUTOMATED COUNT: 15.5 % (ref 11.6–15.1)
EST. AVERAGE GLUCOSE BLD GHB EST-MCNC: 146 MG/DL
FENTANYL UR QL SCN: POSITIVE
GFR SERPL CREATININE-BSD FRML MDRD: 122 ML/MIN/1.73SQ M
GLUCOSE P FAST SERPL-MCNC: 116 MG/DL (ref 65–99)
GLUCOSE SERPL-MCNC: 116 MG/DL (ref 65–140)
GLUCOSE SERPL-MCNC: 123 MG/DL (ref 65–140)
GLUCOSE SERPL-MCNC: 125 MG/DL (ref 65–140)
GLUCOSE SERPL-MCNC: 128 MG/DL (ref 65–140)
GLUCOSE SERPL-MCNC: 131 MG/DL (ref 65–140)
HBA1C MFR BLD: 6.7 %
HCT VFR BLD AUTO: 43.8 % (ref 36.5–49.3)
HGB BLD-MCNC: 13.9 G/DL (ref 12–17)
HYDROCODONE UR QL SCN: NEGATIVE
IMM GRANULOCYTES # BLD AUTO: 0.02 THOUSAND/UL (ref 0–0.2)
IMM GRANULOCYTES NFR BLD AUTO: 0 % (ref 0–2)
LYMPHOCYTES # BLD AUTO: 1.29 THOUSANDS/ÂΜL (ref 0.6–4.47)
LYMPHOCYTES NFR BLD AUTO: 21 % (ref 14–44)
MAGNESIUM SERPL-MCNC: 2.1 MG/DL (ref 1.9–2.7)
MCH RBC QN AUTO: 26.1 PG (ref 26.8–34.3)
MCHC RBC AUTO-ENTMCNC: 31.7 G/DL (ref 31.4–37.4)
MCV RBC AUTO: 82 FL (ref 82–98)
METHADONE UR QL: NEGATIVE
MONOCYTES # BLD AUTO: 0.46 THOUSAND/ÂΜL (ref 0.17–1.22)
MONOCYTES NFR BLD AUTO: 8 % (ref 4–12)
NEUTROPHILS # BLD AUTO: 4.03 THOUSANDS/ÂΜL (ref 1.85–7.62)
NEUTS SEG NFR BLD AUTO: 68 % (ref 43–75)
NRBC BLD AUTO-RTO: 0 /100 WBCS
OPIATES UR QL SCN: NEGATIVE
OXYCODONE+OXYMORPHONE UR QL SCN: NEGATIVE
PCP UR QL: NEGATIVE
PHOSPHATE SERPL-MCNC: 3.7 MG/DL (ref 2.7–4.5)
PLATELET # BLD AUTO: 293 THOUSANDS/UL (ref 149–390)
PMV BLD AUTO: 9.9 FL (ref 8.9–12.7)
POTASSIUM SERPL-SCNC: 4.2 MMOL/L (ref 3.5–5.3)
PROT SERPL-MCNC: 7.2 G/DL (ref 6.4–8.4)
RBC # BLD AUTO: 5.32 MILLION/UL (ref 3.88–5.62)
SODIUM SERPL-SCNC: 136 MMOL/L (ref 135–147)
THC UR QL: NEGATIVE
WBC # BLD AUTO: 6.02 THOUSAND/UL (ref 4.31–10.16)

## 2024-09-04 PROCEDURE — 99232 SBSQ HOSP IP/OBS MODERATE 35: CPT

## 2024-09-04 PROCEDURE — 83735 ASSAY OF MAGNESIUM: CPT

## 2024-09-04 PROCEDURE — 85025 COMPLETE CBC W/AUTO DIFF WBC: CPT

## 2024-09-04 PROCEDURE — 93005 ELECTROCARDIOGRAM TRACING: CPT

## 2024-09-04 PROCEDURE — 74019 RADEX ABDOMEN 2 VIEWS: CPT

## 2024-09-04 PROCEDURE — 84100 ASSAY OF PHOSPHORUS: CPT

## 2024-09-04 PROCEDURE — 99232 SBSQ HOSP IP/OBS MODERATE 35: CPT | Performed by: SURGERY

## 2024-09-04 PROCEDURE — 82948 REAGENT STRIP/BLOOD GLUCOSE: CPT

## 2024-09-04 PROCEDURE — 80307 DRUG TEST PRSMV CHEM ANLYZR: CPT | Performed by: EMERGENCY MEDICINE

## 2024-09-04 PROCEDURE — 80053 COMPREHEN METABOLIC PANEL: CPT

## 2024-09-04 PROCEDURE — 71045 X-RAY EXAM CHEST 1 VIEW: CPT

## 2024-09-04 PROCEDURE — 84484 ASSAY OF TROPONIN QUANT: CPT

## 2024-09-04 RX ORDER — DIPHENHYDRAMINE HYDROCHLORIDE 50 MG/ML
50 INJECTION INTRAMUSCULAR; INTRAVENOUS EVERY 6 HOURS PRN
Status: DISCONTINUED | OUTPATIENT
Start: 2024-09-04 | End: 2024-09-07 | Stop reason: HOSPADM

## 2024-09-04 RX ADMIN — PANTOPRAZOLE SODIUM 40 MG: 40 INJECTION, POWDER, FOR SOLUTION INTRAVENOUS at 08:18

## 2024-09-04 RX ADMIN — ENOXAPARIN SODIUM 40 MG: 40 INJECTION SUBCUTANEOUS at 08:31

## 2024-09-04 RX ADMIN — SODIUM CHLORIDE 75 ML/HR: 0.9 INJECTION, SOLUTION INTRAVENOUS at 08:29

## 2024-09-04 RX ADMIN — PANTOPRAZOLE SODIUM 40 MG: 40 INJECTION, POWDER, FOR SOLUTION INTRAVENOUS at 22:48

## 2024-09-04 NOTE — PROGRESS NOTES
"Progress Note - General Surgery   Galindo Patiño 49 y.o. male MRN: [id number]  Unit/Bed#: [address] [place] [address] Encounter: [id number]    Assessment:  49-year-old Amharic-speaking male with history of GERD, type 2 diabetes, and medication noncompliance presenting with complaints of acute abdominal pain. CT on admission revealed mild distention of proximal small bowel without any obvious transition point and fecalization of distal small bowel  Urine drug screen positive for fentanyl  AVSS  Lactic 2.5 > 2.2  Hgb A1c greatly improved over the past couple months 12.9 > 6.7      Plan:  Xray to check NGT placement, it appears to be advanced too far.  Serial abdominal exams.   Consider repeat CT scan with oral contrast vs gastrograffin challenge   Discussed with patient the possible need for exploratory surgery if his exam would worsen      Subjective/Objective     Subjective: Patient reports the pain has improved in comparison to yesterday however he still doesn't understand why he has this lower abdominal pain. He states the pain is intermittent and is located in bilateral lower quadrants.  He denies any nausea. He denies passing any flatus.      Objective:       Blood pressure 110/73, pulse 77, temperature (!) 97.4 °F (36.3 °C), temperature source Oral, resp. rate 18, height 5' 6\" (1.676 m), weight 92.4 kg (203 lb 11.2 oz), SpO2 90%.,Body mass index is 32.88 kg/m².      Intake/Output Summary (Last 24 hours) at 9/4/2024 0859  Last data filed at 9/3/2024 1915  Gross per 24 hour   Intake 1000 ml   Output --   Net 1000 ml       Invasive Devices       Peripheral Intravenous Line  Duration             Peripheral IV 09/03/24 Antecubital <1 day    Peripheral IV 09/03/24 Right;Ventral (anterior) Wrist <1 day              Drain  Duration             NG/OG/Enteral Tube Nasogastric 16 Fr Right nare <1 day                    Physical Exam: /73 (BP Location: Right arm)   Pulse 77   Temp (!) 97.4 °F (36.3 °C) (Oral)   Resp 18   Ht " "5' 6\" (1.676 m)   Wt 92.4 kg (203 lb 11.2 oz)   SpO2 90%   BMI 32.88 kg/m²   General appearance: alert and oriented, in no acute distress  Head: Normocephalic, without obvious abnormality, atraumatic  Lungs: clear to auscultation bilaterally  Heart: regular rate and rhythm, S1, S2 normal, no murmur, click, rub or gallop  Abdomen:  soft, non distended, bowel sounds present throughout, no tenderness with palpation of stethoscope but patient reports tenderness with tympany and light palpation  Extremities: extremities normal, warm and well-perfused; no cyanosis, clubbing, or edema  Skin: Skin color, texture, turgor normal. No rashes or lesions    Lab, Imaging and other studies:I have personally reviewed pertinent lab results.  , CBC:   Lab Results   Component Value Date    WBC 6.02 09/04/2024    HGB 13.9 09/04/2024    HCT 43.8 09/04/2024    MCV 82 09/04/2024     09/04/2024    RBC 5.32 09/04/2024    MCH 26.1 (L) 09/04/2024    MCHC 31.7 09/04/2024    RDW 15.5 (H) 09/04/2024    MPV 9.9 09/04/2024    NRBC 0 09/04/2024   , CMP:   Lab Results   Component Value Date    SODIUM 136 09/04/2024    K 4.2 09/04/2024     09/04/2024    CO2 23 09/04/2024    BUN 9 09/04/2024    CREATININE 0.57 (L) 09/04/2024    CALCIUM 8.6 09/04/2024    AST 23 09/04/2024    ALT 49 09/04/2024    ALKPHOS 112 (H) 09/04/2024    EGFR 122 09/04/2024     VTE Pharmacologic Prophylaxis: Enoxaparin (Lovenox) SQ  VTE Mechanical Prophylaxis: sequential compression device  "

## 2024-09-04 NOTE — PROGRESS NOTES
[facility]  Progress Note  Name: Galindo Patiño I  MRN: [id number]  Unit/Bed#: 3 [address]  Date of Admission: 9/3/2024   Date of Service: 9/4/2024 I Hospital Day: 0    Assessment & Plan   * Abdominal pain  Assessment & Plan  Patient presented to ED with chest pain/abdominal pain, pointing to epigastric area. Seen in [place] ED 8/28 for chest pain with negative workup at that time. Patient reports symptoms persisted along with acid reflux as well.  CTA c/a/p: Partial/early small bowel obstruction with the mildly distended loops of the small bowel in the upper to mid abdomen with normal caliber ileal loops. Etiology is unclear.  Troponins negative, EKG showed NSR  Lipase negative  General surgery consulted  NG tube in place  NPO  Gastrografin challenge  Monitor abdominal exam  Continue IV protonix    Type 2 diabetes mellitus (HCC)  Assessment & Plan  Lab Results   Component Value Date    HGBA1C 6.7 (H) 09/03/2024       Recent Labs     09/03/24  2242 09/04/24  0101 09/04/24  0636 09/04/24  1258   POCGLU 147* 125 128 131       Blood Sugar Average: Last 72 hrs:  (P) 136  Hold home metformin  SSI q6h while NPO  Diabetic diet when able    GERD (gastroesophageal reflux disease)  Assessment & Plan  Continue IV protonix while NPO    Elevated lactic acid level  Assessment & Plan  Lactic acid 2.5 > 2.2 s/p IVF  Possibly in setting of metformin use         VTE Pharmacologic Prophylaxis: VTE Score: 2 Moderate Risk (Score 3-4) - Pharmacological DVT Prophylaxis Ordered: enoxaparin (Lovenox).    Mobility:   Basic Mobility Inpatient Raw Score: 23  JH-HLM Goal: 7: Walk 25 feet or more  JH-HLM Achieved: 7: Walk 25 feet or more  JH-HLM Goal achieved. Continue to encourage appropriate mobility.    Patient Centered Rounds: I performed bedside rounds with nursing staff today.   Discussions with Specialists or Other Care Team Provider: rn, cm, general surgery    Education and Discussions with Family / Patient:  Patient declined call to .     Total Time Spent on Date of Encounter in care of patient: 40 mins. This time was spent on one or more of the following: performing physical exam; counseling and coordination of care; obtaining or reviewing history; documenting in the medical record; reviewing/ordering tests, medications or procedures; communicating with other healthcare professionals and discussing with patient's family/caregivers.    Current Length of Stay: 0 day(s)  Current Patient Status: Observation   Certification Statement: The patient will continue to require additional inpatient hospital stay due to gastrografin challenge, NG tube  Discharge Plan: Anticipate discharge in 48-72 hrs to home.    Code Status: Level 1 - Full Code    Subjective:   Patient seen and examined at bedside using [place] interpretor [id number]. He is complaining of sore throat due to the NG tube and wants it taken out as soon as possible. He denies other current symptoms including chest pain, shortness of breath, nausea, vomiting, or abdominal pain. Denies pain with palpation of abdomen.    Objective:     Vitals:   Temp (24hrs), Av.5 °F (36.4 °C), Min:97 °F (36.1 °C), Max:97.9 °F (36.6 °C)    Temp:  [97 °F (36.1 °C)-97.9 °F (36.6 °C)] 97.4 °F (36.3 °C)  HR:  [70-94] 77  Resp:  [15-35] 18  BP: ()/(60-83) 110/73  SpO2:  [90 %-99 %] 90 %  Body mass index is 32.88 kg/m².     Input and Output Summary (last 24 hours):     Intake/Output Summary (Last 24 hours) at 2024 1405  Last data filed at 2024 0901  Gross per 24 hour   Intake 1000 ml   Output 775 ml   Net 225 ml       Physical Exam:   Physical Exam  Vitals and nursing note reviewed.   Constitutional:       General: He is not in acute distress.     Appearance: He is well-developed.   HENT:      Nose:      Comments: NG tube in place  Cardiovascular:      Rate and Rhythm: Normal rate and regular rhythm.   Pulmonary:      Effort: Pulmonary effort is normal. No  respiratory distress.      Breath sounds: Normal breath sounds.   Abdominal:      General: Bowel sounds are normal. There is no distension.      Palpations: Abdomen is soft.      Tenderness: There is no abdominal tenderness.   Musculoskeletal:         General: No swelling.   Skin:     General: Skin is warm and dry.   Neurological:      Mental Status: He is alert.   Psychiatric:         Mood and Affect: Mood normal.        Additional Data:     Labs:  Results from last 7 days   Lab Units 09/04/24  0814   WBC Thousand/uL 6.02   HEMOGLOBIN g/dL 13.9   HEMATOCRIT % 43.8   PLATELETS Thousands/uL 293   SEGS PCT % 68   LYMPHO PCT % 21   MONO PCT % 8   EOS PCT % 3     Results from last 7 days   Lab Units 09/04/24  0622   SODIUM mmol/L 136   POTASSIUM mmol/L 4.2   CHLORIDE mmol/L 105   CO2 mmol/L 23   BUN mg/dL 9   CREATININE mg/dL 0.57*   ANION GAP mmol/L 8   CALCIUM mg/dL 8.6   ALBUMIN g/dL 3.5   TOTAL BILIRUBIN mg/dL 0.61   ALK PHOS U/L 112*   ALT U/L 49   AST U/L 23   GLUCOSE RANDOM mg/dL 116     Results from last 7 days   Lab Units 09/03/24  1438   INR  0.95     Results from last 7 days   Lab Units 09/04/24  1258 09/04/24  0636 09/04/24  0101 09/03/24  2242 09/03/24  1909   POC GLUCOSE mg/dl 131 128 125 147* 149*     Results from last 7 days   Lab Units 09/03/24  1438   HEMOGLOBIN A1C % 6.7*     Results from last 7 days   Lab Units 09/03/24  1720 09/03/24  1438   LACTIC ACID mmol/L 2.2* 2.5*   PROCALCITONIN ng/ml  --  0.07       Lines/Drains:  Invasive Devices       Peripheral Intravenous Line  Duration             Peripheral IV 09/03/24 Antecubital <1 day    Peripheral IV 09/03/24 Right;Ventral (anterior) Wrist <1 day              Drain  Duration             NG/OG/Enteral Tube Nasogastric 16 Fr Right nare <1 day                    Imaging: Reviewed radiology reports from this admission including: chest xray, chest CT scan, and abdominal/pelvic CT    Recent Cultures (last 7 days):   Results from last 7 days   Lab Units  09/03/24  1515   BLOOD CULTURE  Received in Microbiology Lab. Culture in Progress.       Last 24 Hours Medication List:   Current Facility-Administered Medications   Medication Dose Route Frequency Provider Last Rate    diphenhydrAMINE  50 mg Intravenous Q6H PRN Nelson Slade PA-C      enoxaparin  40 mg Subcutaneous Q24H LIOR Spangler MD      insulin lispro  1-6 Units Subcutaneous Q6H LIOR Ambriz MD      morphine injection  2 mg Intravenous Q4H PRN Ericka Ambriz MD      ondansetron  4 mg Intravenous Q8H PRN Ericka Ambriz MD      pantoprazole  40 mg Intravenous Q12H [facility] Ericka Ambriz MD      phenol  2 spray Mouth/Throat Q2H PRN Nelson Slade PA-C      sodium chloride  75 mL/hr Intravenous Continuous Ericka Ambriz MD 75 mL/hr (09/04/24 0829)        Today, Patient Was Seen By: Tamia Orozco PA-C    **Please Note: This note may have been constructed using a voice recognition system.**

## 2024-09-04 NOTE — UTILIZATION REVIEW
Initial Clinical Review    Observation 9/3/24 @ 1723 converted to inpatient admission 9/4/24 @ 1428 for continued care & tx for SBO.     Admission: Date/Time/Statement:   Admission Orders (From admission, onward)       Ordered        09/04/24 1428  INPATIENT ADMISSION  Once            09/03/24 1723  Place in Observation  Once                          Orders Placed This Encounter   Procedures    INPATIENT ADMISSION     Standing Status:   Standing     Number of Occurrences:   1     Order Specific Question:   Level of Care     Answer:   Med Surg [16]     Order Specific Question:   Estimated length of stay     Answer:   More than 2 Midnights     Order Specific Question:   Certification     Answer:   I certify that inpatient services are medically necessary for this patient for a duration of greater than two midnights. See H&P and MD Progress Notes for additional information about the patient's course of treatment.     ED Arrival Information       Expected   -    Arrival   9/3/2024 14:17    Acuity   Emergent              Means of arrival   Ambulance    Escorted by   [facility]   Hospitalist    Admission type   Emergency              Arrival complaint   Abdominal Pain             Chief Complaint   Patient presents with    Abdominal Pain     PT reports RUQ abdominal pain, tender upon palpation. 100mcg fentanyl, 324 asprin, 4mg zofan given  in route.       Initial Presentation:  49 yom to ER from home via EMS for abd & chest pain. Had Fentanyl en route with no improvement. Per chart review, patient was seen at [place] ED several days ago and had a chest pain evaluation that was unremarkable. Hx type 2 diabetes, medication noncompliance. Presents hypotensive, diffuse abd tenderness & guarding. Admission CT: Partial/early small bowel obstruction with the mildly distended loops of the small bowel in the upper to mid abdomen with normal caliber ileal loops. Etiology is unclear. Nonspecific 3 mm left  upper lobe lung nodule. Labs: , Na 134, alt 62, alk phos 124, gluc 156, lactic acid 2.5  Placed I observation status for acute abd pain.    Per surgery: Partial small bowel obstruction abdominal pain   Abdominal pain upper abdominal and epigastric and both upper quadrant  Peptic ulcer disease  Elevated lactic acid  Non-insulin-dependent diabetes mellitus  Normal WBC count normal potassium  /Electrolytes/elevated lactic acid repeat last at 6 PM is 2.2  Enlarged lower right paratracheal lymph nodes  Will need a PET scan on outpatient basis /patient has history of smoking     Plan:  NG tube to low continuous wall suction  NG tube repositioned and secured flushed it is working minimal bile drainage and aspirated  IV hydration  IV Protonix  Lovenox for DVT prophylaxis  Lower extremity Venodyne's  Hemoglobin A1c and appropriate control of diabetes mellitus  Repeat labs in the morning    Observation to IP admission 9/4/24:  SBO POA. Abdomen:  soft, non distended, bowel sounds present throughout, no tenderness with palpation of stethoscope but patient reports tenderness with tympany and light palpation. NGT remains in place. Repeat xrays ordered, possible CT with po contrast vs gastrograffin study pending results. Cotninue serial abd exams.     Date: 9/5/24  DAY 2:  Gastrograffin challenge without evidence bowel obstruction. NGT d/c'd, trial diabetic clear liquids. Mild persistent epigastric pain/tenderness, guarding, +BS. Remains on IVF & IV PPI at this time. Continue serial abd exams, monitor VS, pain mgt, follow labs.         ED Triage Vitals   Temperature Pulse Respirations Blood Pressure SpO2 Pain Score   09/03/24 1431 09/03/24 1431 09/03/24 1431 09/03/24 1431 09/03/24 1431 09/03/24 1530   (!) 97 °F (36.1 °C) 81 20 (!) 88/70 98 % 10 - Worst Possible Pain     Weight (last 2 days)       Date/Time Weight    09/03/24 2000 92.4 (203.7)            Vital Signs (last 3 days)       Date/Time Temp Pulse Resp BP MAP (mmHg)  SpO2 Calculated FIO2 (%) - Nasal Cannula Nasal Cannula O2 Flow Rate (L/min) O2 Device Patient Position - Orthostatic VS Pain    09/05/24 0943 98.4 °F (36.9 °C) 93 18 122/78 95 92 % -- -- None (Room air) Sitting --    09/05/24 0800 -- -- -- -- -- -- -- -- -- -- No Pain    09/04/24 1954 98 °F (36.7 °C) 74 20 125/79 97 91 % -- -- None (Room air) Lying 2    09/04/24 0700 97.4 °F (36.3 °C) 77 18 110/73 -- 90 % -- -- None (Room air) Lying --    09/04/24 0420 97.5 °F (36.4 °C) 74 20 108/74 86 92 % -- -- None (Room air) Lying 5    09/03/24 2000 -- 74 -- -- -- -- -- -- -- -- --    09/03/24 1937 97.9 °F (36.6 °C) 94 22 126/80 98 96 % -- -- None (Room air) Lying 5    09/03/24 1900 -- 92 15 143/82 106 93 % -- -- None (Room air) Lying --    09/03/24 1800 -- 85 21 138/83 104 96 % -- -- -- Lying --    09/03/24 1730 -- 93 35 107/65 80 99 % -- -- None (Room air) Lying 4    09/03/24 1630 -- 82 21 104/71 84 99 % 28 2 L/min Nasal cannula Lying 4    09/03/24 1600 -- 83 23 97/60 75 93 % -- -- None (Room air) Lying --    09/03/24 1530 -- 82 21 94/66 76 93 % -- -- None (Room air) Lying 10 - Worst Possible Pain    09/03/24 1504 -- 70 20 115/74 -- 97 % -- -- None (Room air) -- --    09/03/24 1503 -- -- -- 100/60 -- -- -- -- -- -- --    09/03/24 1431 97 °F (36.1 °C) 81 20 88/70 -- 98 % -- -- None (Room air) -- --              Pertinent Labs/Diagnostic Test Results:   Radiology:  XR abdomen complete inc upright and/or decubitus   Final Interpretation by Klever Lomax MD (09/05 0719)      No evidence of bowel obstruction.         Workstation performed: [id number]         XR chest portable   Final Interpretation by Maria Del Rosario Khan MD (09/04 1051)      NG tube in second portion of duodenum.      Low lung volumes with vascular crowding and atelectasis. Probable superimposed pulmonary edema.            Workstation performed: [id number]         CTA dissection protocol chest/abdomen/pelvis   Final Interpretation by Johnna Walden MD  (09/03 1622)      No evidence of thoracic aortic dissection   No evidence of pulmonary embolism      Partial/early small bowel obstruction with the mildly distended loops of the small bowel in the upper to mid abdomen with normal caliber ileal loops.. Etiology is unclear      Incidentally noted is a enlarged lower right paratracheal lymph node measuring 2 cm. This can be assessed with the PET scan performed on nonemergent basis      Nonspecific 3 mm left upper lobe lung nodule, image 35 series 605. Based on current Fleischner Society 2017 Guidelines on incidental pulmonary nodule, no routine follow-up is needed if the patient is low risk. If the patient is high risk, optional    follow-up chest CT at 12 months can be considered.         The study was marked in EPIC for immediate notification.         Workstation performed: [id number]           Cardiology:  No orders to display     GI:  No orders to display           Results from last 7 days   Lab Units 09/05/24  0627 09/04/24  0814 09/03/24  1438   WBC Thousand/uL 6.37 6.02 8.94   HEMOGLOBIN g/dL 13.9 13.9 15.2   HEMATOCRIT % 43.1 43.8 46.0   PLATELETS Thousands/uL 302 293 392*   TOTAL NEUT ABS Thousands/µL  --  4.03 6.33         Results from last 7 days   Lab Units 09/05/24  0627 09/04/24  0622 09/03/24  1438   SODIUM mmol/L 135 136 134*   POTASSIUM mmol/L 4.0 4.2 4.0   CHLORIDE mmol/L 100 105 99   CO2 mmol/L 26 23 24   ANION GAP mmol/L 9 8 11   BUN mg/dL 10 9 15   CREATININE mg/dL 0.62 0.57* 0.80   EGFR ml/min/1.73sq m 118 122 106   CALCIUM mg/dL 9.4 8.6 10.0   MAGNESIUM mg/dL 1.9 2.1  --    PHOSPHORUS mg/dL  --  3.7  --      Results from last 7 days   Lab Units 09/04/24  0622 09/03/24  1438   AST U/L 23 24   ALT U/L 49 62*   ALK PHOS U/L 112* 124*   TOTAL PROTEIN g/dL 7.2 8.4   ALBUMIN g/dL 3.5 4.2   TOTAL BILIRUBIN mg/dL 0.61 0.53     Results from last 7 days   Lab Units 09/05/24  1048 09/05/24  0949 09/05/24  0629 09/04/24  1739 09/04/24  1258 09/04/24  0636  09/04/24  0101 09/03/24  2242 09/03/24  1909   POC GLUCOSE mg/dl 182* 241* 147* 123 131 128 125 147* 149*     Results from last 7 days   Lab Units 09/05/24  0627 09/04/24  0622 09/03/24  1438   GLUCOSE RANDOM mg/dL 128 116 156*     Results from last 7 days   Lab Units 09/03/24  1438   HEMOGLOBIN A1C % 6.7*   EAG mg/dl 146     Results from last 7 days   Lab Units 09/04/24  0111 09/03/24  2301 09/03/24  2050 09/03/24  1438   HS TNI 0HR ng/L  --   --  <2 <2   HS TNI 2HR ng/L  --  <2  --   --    HS TNI 4HR ng/L <2  --   --   --      Results from last 7 days   Lab Units 09/03/24  1438   PROTIME seconds 13.1   INR  0.95   PTT seconds 30     Results from last 7 days   Lab Units 09/05/24  0627   TSH 3RD GENERATON uIU/mL 0.710     Results from last 7 days   Lab Units 09/03/24  1438   PROCALCITONIN ng/ml 0.07     Results from last 7 days   Lab Units 09/03/24  1720 09/03/24  1438   LACTIC ACID mmol/L 2.2* 2.5*     Results from last 7 days   Lab Units 09/03/24  1438   LIPASE u/L 21     Results from last 7 days   Lab Units 09/04/24  0218   AMPH/METH  Negative   BARBITURATE UR  Negative   BENZODIAZEPINE UR  Negative   COCAINE UR  Negative   METHADONE URINE  Negative   OPIATE UR  Negative   PCP UR  Negative   THC UR  Negative     Results from last 7 days   Lab Units 09/03/24  1515   BLOOD CULTURE  No Growth at 24 hrs.       ED Treatment-Medication Administration from 09/03/2024 1417 to 09/03/2024 1928         Date/Time Order Dose Route Action     09/03/2024 1438 sodium chloride 0.9 % bolus 1,000 mL 1,000 mL Intravenous New Bag     09/03/2024 1456 iohexol (OMNIPAQUE) 350 MG/ML injection (MULTI-DOSE) 100 mL 100 mL Intravenous Given     09/03/2024 1815 sodium chloride 0.9 % bolus 1,000 mL 1,000 mL Intravenous New Bag     09/03/2024 1911 sodium chloride 0.9 % infusion 75 mL/hr Intravenous New Bag     09/03/2024 1914 pantoprazole (PROTONIX) injection 40 mg 40 mg Intravenous Given              Admitting Diagnosis: SBO (small bowel  obstruction) (HCC) [K56.609]  Abdominal pain [R10.9]  Elevated lactic acid level [R79.89]  Age/Sex: 49 y.o. male  Admission Orders:  NGT>LCWS  Consult surgery  Accuchecks  Scd    Scheduled Medications:  Medications 08/27 08/28 08/29 08/30 08/31 09/01 09/02 09/03 09/04 09/05   acetaminophen (TYLENOL) tablet 975 mg  Dose: 975 mg  Freq: Once Route: PO  Start: 08/28/24 0730 End: 08/28/24 0734     0734                diphenhydrAMINE (BENADRYL) injection 25 mg  Dose: 25 mg  Freq: Once Route: IV  Start: 08/28/24 0730 End: 08/28/24 0737   Admin Instructions:        0737                enoxaparin (LOVENOX) subcutaneous injection 40 mg  Dose: 40 mg  Freq: Every 24 hours scheduled Route: SC  Start: 09/04/24 0900   Admin Instructions:               0831      1000        insulin lispro (HumALOG/ADMELOG) 100 units/mL subcutaneous injection 1-5 Units  Dose: 1-5 Units  Freq: Daily at bedtime Route: SC  Start: 09/05/24 2200   Admin Instructions:                2200         insulin lispro (HumALOG/ADMELOG) 100 units/mL subcutaneous injection 1-6 Units  Dose: 1-6 Units  Freq: 3 times daily before meals Route: SC  Start: 09/05/24 0800   Admin Instructions:                1007     1223     1600         insulin lispro (HumALOG/ADMELOG) 100 units/mL subcutaneous injection 1-6 Units  Dose: 1-6 Units  Freq: Every 6 hours scheduled Route: SC  Start: 09/03/24 1830 End: 09/05/24 0758   Admin Instructions:              (5640)      (8512) [C]     (9438) [C]     (4311) [C]     (4867) [C]      (2393) [C]     (0731) [C]     1348-D/C'd      ketorolac (TORADOL) injection 15 mg  Dose: 15 mg  Freq: Once Route: IV  Start: 08/28/24 0915 End: 08/28/24 1000   Admin Instructions:        1000                metoclopramide (REGLAN) injection 10 mg  Dose: 10 mg  Freq: Once Route: IV  Start: 08/28/24 0730 End: 08/28/24 0740     0740                nicotine (NICODERM CQ) 7 mg/24hr TD 24 hr patch 1 patch  Dose: 1 patch  Freq: Daily Route: TD  Start: 09/05/24 0900    Admin Instructions:                1007        pantoprazole (PROTONIX) injection 40 mg  Dose: 40 mg  Freq: Every 12 hours scheduled Route: IV  Start: 09/03/24 1845   Admin Instructions:              1914      0818     2248      1001     2100        pantoprazole (PROTONIX) injection 40 mg  Dose: 40 mg  Freq: Every 24 hours scheduled Route: IV  Start: 09/03/24 1830 End: 09/03/24 1842   Admin Instructions:              1842-D/C'd  (1910)          sodium chloride 0.9 % bolus 1,000 mL  Dose: 1,000 mL  Freq: Once Route: IV  Last Dose: Stopped (09/03/24 1915)  Start: 09/03/24 1730 End: 09/03/24 1915 1815 1915          sodium chloride 0.9 % bolus 1,000 mL  Dose: 1,000 mL  Freq: Once Route: IV  Last Dose: Stopped (09/03/24 1539)  Start: 09/03/24 1430 End: 09/03/24 1539           1438     1539          sodium chloride 0.9 % bolus 1,000 mL  Dose: 1,000 mL  Freq: Once Route: IV  Last Dose: Stopped (08/28/24 1021)  Start: 08/28/24 0730 End: 08/28/24 1021     0736     1021                sucralfate (CARAFATE) tablet 1 g  Dose: 1 g  Freq: 4 times daily (before meals and at bedtime) Route: PO  Start: 09/03/24 1830 End: 09/03/24 1909   Admin Instructions:              1909-D/C'd  (1911)                      Continuous Meds Sorted by Name  for Galindo Patiño as of 08/27/24 through 9/5/24  Legend:       Medications 08/27 08/28 08/29 08/30 08/31 09/01 09/02 09/03 09/04 09/05   sodium chloride 0.9 % infusion  Rate: 75 mL/hr Dose: 75 mL/hr  Freq: Continuous Route: IV  Last Dose: 75 mL/hr (09/04/24 0829)  Start: 09/03/24 1830 1911      0829                     PRN Meds Sorted by Name  for Galindo Patiño as of 08/27/24 through 9/5/24  Legend:       Medications 08/27 08/28 08/29 08/30 08/31 09/01 09/02 09/03 09/04 09/05   diphenhydrAMINE (BENADRYL) injection 50 mg  Dose: 50 mg  Freq: Every 6 hours PRN Route: IV  PRN Reasons: itching,allergies,pruritis,sleep  Start: 09/04/24 1225   Admin Instructions:                    iohexol (OMNIPAQUE) 350 MG/ML injection (MULTI-DOSE) 100 mL  Dose: 100 mL  Freq: Once in imaging Route: IV  PRN Reason: contrast  Start: 09/03/24 1450 End: 09/03/24 1456           1456          morphine injection 2 mg  Dose: 2 mg  Freq: Every 4 hours PRN Route: IV  PRN Reason: severe pain  Start: 09/03/24 1829   Admin Instructions:                   ondansetron (ZOFRAN) injection 4 mg  Dose: 4 mg  Freq: Every 8 hours PRN Route: IV  PRN Reasons: nausea,vomiting  Start: 09/03/24 1823   Admin Instructions:                   phenol (CHLORASEPTIC) 1.4 % mucosal liquid 2 spray  Dose: 2 spray  Freq: Every 2 hour PRN Route: MT  PRN Reason: sore throat  Start: 09/04/24 1252   Admin Instructions:                   Legend:       [id number]/2908/3008/3109/0109/0209/0309/0409/05            Network Utilization Review Department  ATTENTION: Please call with any questions or concerns to [phone] and carefully listen to the prompts so that you are directed to the right person. All voicemails are confidential.   For Discharge needs, contact Care Management DC Support Team at [phone] opt. 2  Send all requests for admission clinical reviews, approved or denied determinations and any other requests to dedicated fax number below belonging to the campus where the patient is receiving treatment. List of dedicated fax numbers for the Facilities:  FACILITY NAME UR FAX NUMBER   ADMISSION DENIALS (Administrative/Medical Necessity) [phone]   DISCHARGE SUPPORT TEAM (NETWORK) [phone]   PARENT CHILD HEALTH (Maternity/NICU/Pediatrics) [phone]   [facility] [phone]   [facility] [phone]   [facility] [phone]   [facility] [phone]   [facility] [phone]   [facility] [phone]   [facility] [phone]    [facility] [phone]   [facility] [phone]   [facility] [phone]   [facility] [phone]   [facility] [phone]

## 2024-09-04 NOTE — QUICK NOTE
Patient has a mild shellfish allergy with recorded reaction of rash.   Ok to give patient gastrografin.  IV benadryl PRN has been ordered.  No documented history of anaphylaxis with iodine.  Patient had a CT scan with IV contrast yesterday without any issues.

## 2024-09-05 LAB
ANION GAP SERPL CALCULATED.3IONS-SCNC: 9 MMOL/L (ref 4–13)
ATRIAL RATE: 78 BPM
ATRIAL RATE: 81 BPM
ATRIAL RATE: 93 BPM
ATRIAL RATE: 95 BPM
BUN SERPL-MCNC: 10 MG/DL (ref 5–25)
CALCIUM SERPL-MCNC: 9.4 MG/DL (ref 8.4–10.2)
CHLORIDE SERPL-SCNC: 100 MMOL/L (ref 96–108)
CO2 SERPL-SCNC: 26 MMOL/L (ref 21–32)
CREAT SERPL-MCNC: 0.62 MG/DL (ref 0.6–1.3)
ERYTHROCYTE [DISTWIDTH] IN BLOOD BY AUTOMATED COUNT: 15.2 % (ref 11.6–15.1)
GFR SERPL CREATININE-BSD FRML MDRD: 118 ML/MIN/1.73SQ M
GLUCOSE SERPL-MCNC: 124 MG/DL (ref 65–140)
GLUCOSE SERPL-MCNC: 124 MG/DL (ref 65–140)
GLUCOSE SERPL-MCNC: 128 MG/DL (ref 65–140)
GLUCOSE SERPL-MCNC: 147 MG/DL (ref 65–140)
GLUCOSE SERPL-MCNC: 182 MG/DL (ref 65–140)
GLUCOSE SERPL-MCNC: 241 MG/DL (ref 65–140)
HCT VFR BLD AUTO: 43.1 % (ref 36.5–49.3)
HGB BLD-MCNC: 13.9 G/DL (ref 12–17)
MAGNESIUM SERPL-MCNC: 1.9 MG/DL (ref 1.9–2.7)
MCH RBC QN AUTO: 26.4 PG (ref 26.8–34.3)
MCHC RBC AUTO-ENTMCNC: 32.3 G/DL (ref 31.4–37.4)
MCV RBC AUTO: 82 FL (ref 82–98)
P AXIS: 24 DEGREES
P AXIS: 49 DEGREES
P AXIS: 53 DEGREES
P AXIS: 59 DEGREES
PLATELET # BLD AUTO: 302 THOUSANDS/UL (ref 149–390)
PMV BLD AUTO: 9.3 FL (ref 8.9–12.7)
POTASSIUM SERPL-SCNC: 4 MMOL/L (ref 3.5–5.3)
PR INTERVAL: 162 MS
PR INTERVAL: 164 MS
PR INTERVAL: 170 MS
PR INTERVAL: 182 MS
QRS AXIS: 15 DEGREES
QRS AXIS: 16 DEGREES
QRS AXIS: 39 DEGREES
QRS AXIS: 8 DEGREES
QRSD INTERVAL: 68 MS
QRSD INTERVAL: 70 MS
QRSD INTERVAL: 72 MS
QRSD INTERVAL: 74 MS
QT INTERVAL: 354 MS
QT INTERVAL: 366 MS
QT INTERVAL: 366 MS
QT INTERVAL: 368 MS
QTC INTERVAL: 417 MS
QTC INTERVAL: 427 MS
QTC INTERVAL: 444 MS
QTC INTERVAL: 455 MS
RBC # BLD AUTO: 5.27 MILLION/UL (ref 3.88–5.62)
SODIUM SERPL-SCNC: 135 MMOL/L (ref 135–147)
T WAVE AXIS: 10 DEGREES
T WAVE AXIS: 13 DEGREES
T WAVE AXIS: 25 DEGREES
T WAVE AXIS: 49 DEGREES
TSH SERPL DL<=0.05 MIU/L-ACNC: 0.71 UIU/ML (ref 0.45–4.5)
VENTRICULAR RATE: 78 BPM
VENTRICULAR RATE: 81 BPM
VENTRICULAR RATE: 93 BPM
VENTRICULAR RATE: 95 BPM
WBC # BLD AUTO: 6.37 THOUSAND/UL (ref 4.31–10.16)

## 2024-09-05 PROCEDURE — 83735 ASSAY OF MAGNESIUM: CPT

## 2024-09-05 PROCEDURE — 82948 REAGENT STRIP/BLOOD GLUCOSE: CPT

## 2024-09-05 PROCEDURE — 93005 ELECTROCARDIOGRAM TRACING: CPT

## 2024-09-05 PROCEDURE — 80048 BASIC METABOLIC PNL TOTAL CA: CPT

## 2024-09-05 PROCEDURE — 93010 ELECTROCARDIOGRAM REPORT: CPT | Performed by: INTERNAL MEDICINE

## 2024-09-05 PROCEDURE — 99232 SBSQ HOSP IP/OBS MODERATE 35: CPT

## 2024-09-05 PROCEDURE — 85027 COMPLETE CBC AUTOMATED: CPT

## 2024-09-05 PROCEDURE — 84443 ASSAY THYROID STIM HORMONE: CPT

## 2024-09-05 RX ORDER — INSULIN LISPRO 100 [IU]/ML
1-5 INJECTION, SOLUTION INTRAVENOUS; SUBCUTANEOUS
Status: DISCONTINUED | OUTPATIENT
Start: 2024-09-05 | End: 2024-09-07 | Stop reason: HOSPADM

## 2024-09-05 RX ORDER — INSULIN LISPRO 100 [IU]/ML
1-6 INJECTION, SOLUTION INTRAVENOUS; SUBCUTANEOUS
Status: DISCONTINUED | OUTPATIENT
Start: 2024-09-05 | End: 2024-09-07 | Stop reason: HOSPADM

## 2024-09-05 RX ADMIN — PANTOPRAZOLE SODIUM 40 MG: 40 INJECTION, POWDER, FOR SOLUTION INTRAVENOUS at 21:16

## 2024-09-05 RX ADMIN — ENOXAPARIN SODIUM 40 MG: 40 INJECTION SUBCUTANEOUS at 10:00

## 2024-09-05 RX ADMIN — PANTOPRAZOLE SODIUM 40 MG: 40 INJECTION, POWDER, FOR SOLUTION INTRAVENOUS at 10:01

## 2024-09-05 RX ADMIN — NICOTINE 1 PATCH: 7 PATCH, EXTENDED RELEASE TRANSDERMAL at 10:07

## 2024-09-05 RX ADMIN — INSULIN LISPRO 1 UNITS: 100 INJECTION, SOLUTION INTRAVENOUS; SUBCUTANEOUS at 12:23

## 2024-09-05 RX ADMIN — INSULIN LISPRO 3 UNITS: 100 INJECTION, SOLUTION INTRAVENOUS; SUBCUTANEOUS at 10:07

## 2024-09-05 NOTE — QUICK NOTE
Gastrograffin challenge with contrast throughout colon.  No obvious small bowel distention.    Overall picture not consistent with SBO.    NGT may be removed and he may start liquids.

## 2024-09-05 NOTE — ASSESSMENT & PLAN NOTE
Lab Results   Component Value Date    HGBA1C 6.7 (H) 09/03/2024       Recent Labs     09/04/24  1258 09/04/24  1739 09/05/24  0629 09/05/24  0949   POCGLU 131 123 147* 241*       Blood Sugar Average: Last 72 hrs:  (P) 148.875  Hold home metformin  SSI ac + hs  Switch to diabetic clear liquids

## 2024-09-05 NOTE — PROGRESS NOTES
[facility]  Progress Note  Name: Galindo Patiño I  MRN: [id number]  Unit/Bed#: 3 [address]  Date of Admission: 9/3/2024   Date of Service: 9/5/2024 I Hospital Day: 1    Assessment & Plan   * Abdominal pain  Assessment & Plan  Patient presented to ED with chest pain/abdominal pain, pointing to epigastric area. Seen in [place] ED 8/28 for chest pain with negative workup at that time. Patient reports symptoms persisted along with acid reflux as well.  CTA c/a/p: Partial/early small bowel obstruction with the mildly distended loops of the small bowel in the upper to mid abdomen with normal caliber ileal loops. Etiology is unclear.  Troponins negative, EKG showed NSR  Lipase negative  General surgery consulted  Gastrografin challenge 9/4 without evidence of bowel obstruction  NG tube removed, started on clear liquid diet  Advance as tolerated  Monitor abdominal exam  Continue protonix    Type 2 diabetes mellitus (HCC)  Assessment & Plan  Lab Results   Component Value Date    HGBA1C 6.7 (H) 09/03/2024       Recent Labs     09/04/24  1258 09/04/24  1739 09/05/24  0629 09/05/24  0949   POCGLU 131 123 147* 241*       Blood Sugar Average: Last 72 hrs:  (P) 148.875  Hold home metformin  SSI ac + hs  Switch to diabetic clear liquids    GERD (gastroesophageal reflux disease)  Assessment & Plan  Continue Protonix    Elevated lactic acid level  Assessment & Plan  Lactic acid 2.5 > 2.2 s/p IVF  Possibly in setting of metformin use         VTE Pharmacologic Prophylaxis: VTE Score: 2 Moderate Risk (Score 3-4) - Pharmacological DVT Prophylaxis Ordered: enoxaparin (Lovenox).    Mobility:   Basic Mobility Inpatient Raw Score: 24  JH-HLM Goal: 8: Walk 250 feet or more  JH-HLM Achieved: 7: Walk 25 feet or more  JH-HLM Goal NOT achieved. Continue with multidisciplinary rounding and encourage appropriate mobility to improve upon JH-HLM goals.    Patient Centered Rounds: I performed bedside rounds with nursing  "staff today.   Discussions with Specialists or Other Care Team Provider: [email or web address]    Education and Discussions with Family / Patient: Patient declined call to .     Total Time Spent on Date of Encounter in care of patient: 35 mins. This time was spent on one or more of the following: performing physical exam; counseling and coordination of care; obtaining or reviewing history; documenting in the medical record; reviewing/ordering tests, medications or procedures; communicating with other healthcare professionals and discussing with patient's family/caregivers.    Current Length of Stay: 1 day(s)  Current Patient Status: Inpatient   Certification Statement: The patient will continue to require additional inpatient hospital stay due to abdominal pain, advance diet  Discharge Plan: Anticipate discharge in 24-48 hrs to home.    Code Status: Level 1 - Full Code    Subjective:   Patient seen and examined at bedside using  [id number].  He reports he feels better now that the NG tube is removed.  He denies nausea or vomiting, but states he has \"a little bit\" of epigastric pain.  Overall improved.  Denies any new symptoms including fever, chills, chest pain, shortness of breath.  So far tolerating clear liquid diet.    Objective:     Vitals:   Temp (24hrs), Av.2 °F (36.8 °C), Min:98 °F (36.7 °C), Max:98.4 °F (36.9 °C)    Temp:  [98 °F (36.7 °C)-98.4 °F (36.9 °C)] 98.4 °F (36.9 °C)  HR:  [74-93] 93  Resp:  [18-20] 18  BP: (122-125)/(78-79) 122/78  SpO2:  [91 %-92 %] 92 %  Body mass index is 32.88 kg/m².     Input and Output Summary (last 24 hours):     Intake/Output Summary (Last 24 hours) at 2024 0954  Last data filed at 2024 1400  Gross per 24 hour   Intake --   Output 750 ml   Net -750 ml       Physical Exam:   Physical Exam  Vitals and nursing note reviewed.   Constitutional:       General: He is not in acute distress.     Appearance: He is well-developed.   Cardiovascular:      Rate " and Rhythm: Normal rate and regular rhythm.   Pulmonary:      Effort: Pulmonary effort is normal. No respiratory distress.      Breath sounds: Normal breath sounds.   Abdominal:      General: Bowel sounds are normal.      Palpations: Abdomen is soft.      Tenderness: There is abdominal tenderness (mild) in the epigastric area.   Musculoskeletal:         General: No swelling.   Skin:     General: Skin is warm and dry.   Neurological:      Mental Status: He is alert.   Psychiatric:         Mood and Affect: Mood normal.          Additional Data:     Labs:  Results from last 7 days   Lab Units 09/05/24  0627 09/04/24  0814   WBC Thousand/uL 6.37 6.02   HEMOGLOBIN g/dL 13.9 13.9   HEMATOCRIT % 43.1 43.8   PLATELETS Thousands/uL 302 293   SEGS PCT %  --  68   LYMPHO PCT %  --  21   MONO PCT %  --  8   EOS PCT %  --  3     Results from last 7 days   Lab Units 09/05/24  0627 09/04/24  0622   SODIUM mmol/L 135 136   POTASSIUM mmol/L 4.0 4.2   CHLORIDE mmol/L 100 105   CO2 mmol/L 26 23   BUN mg/dL 10 9   CREATININE mg/dL 0.62 0.57*   ANION GAP mmol/L 9 8   CALCIUM mg/dL 9.4 8.6   ALBUMIN g/dL  --  3.5   TOTAL BILIRUBIN mg/dL  --  0.61   ALK PHOS U/L  --  112*   ALT U/L  --  49   AST U/L  --  23   GLUCOSE RANDOM mg/dL 128 116     Results from last 7 days   Lab Units 09/03/24  1438   INR  0.95     Results from last 7 days   Lab Units 09/05/24  0949 09/05/24  0629 09/04/24  1739 09/04/24  1258 09/04/24  0636 09/04/24  0101 09/03/24  2242 09/03/24  1909   POC GLUCOSE mg/dl 241* 147* 123 131 128 125 147* 149*     Results from last 7 days   Lab Units 09/03/24  1438   HEMOGLOBIN A1C % 6.7*     Results from last 7 days   Lab Units 09/03/24  1720 09/03/24  1438   LACTIC ACID mmol/L 2.2* 2.5*   PROCALCITONIN ng/ml  --  0.07       Lines/Drains:  Invasive Devices       Peripheral Intravenous Line  Duration             Peripheral IV 09/03/24 Antecubital 1 day                    Imaging: Reviewed radiology reports from this admission  including: xray(s)    Recent Cultures (last 7 days):   Results from last 7 days   Lab Units 09/03/24  1515   BLOOD CULTURE  No Growth at 24 hrs.       Last 24 Hours Medication List:   Current Facility-Administered Medications   Medication Dose Route Frequency Provider Last Rate    diphenhydrAMINE  50 mg Intravenous Q6H PRN Nelson Slade PA-C      enoxaparin  40 mg Subcutaneous Q24H LIOR Spangler MD      insulin lispro  1-5 Units Subcutaneous HS Tamia Orozco PA-C      insulin lispro  1-6 Units Subcutaneous TID AC Tamia Orozco PA-C      morphine injection  2 mg Intravenous Q4H PRN Ericka Ambriz MD      nicotine  1 patch Transdermal Daily Ericka Ambriz MD      ondansetron  4 mg Intravenous Q8H PRN Ericka Ambriz MD      pantoprazole  40 mg Intravenous Q12H [facility] Ericka Ambriz MD      phenol  2 spray Mouth/Throat Q2H PRN Nelson Slade PA-C      sodium chloride  75 mL/hr Intravenous Continuous Ericka Ambriz MD 75 mL/hr (09/04/24 0840)        Today, Patient Was Seen By: Tamia Orozco PA-C    **Please Note: This note may have been constructed using a voice recognition system.**

## 2024-09-05 NOTE — PHYSICAL THERAPY NOTE
PHYSICAL THERAPY     09/05/24 1202   Note Type   Note type Screen  (patient independent with bed mobility, transfers, gait and ADLs and does not require skilled PT/OT services at this time. He is ambulating independently in the room. Nephew present for translation. DC PT/OT)   Discharge Recommendation   Rehab Resource Intensity Level, PT No post-acute rehabilitation needs   Licensure   [place] License Number  Sue Hilton PT [id number]

## 2024-09-05 NOTE — OCCUPATIONAL THERAPY NOTE
Occupational Therapy Screen     Patient Name: Galindo Patiño  Today's Date: 9/5/2024  Problem List  Principal Problem:    Abdominal pain  Active Problems:    Type 2 diabetes mellitus (HCC)    Elevated lactic acid level    GERD (gastroesophageal reflux disease)    Past Medical History  History reviewed. No pertinent past medical history.  Past Surgical History  History reviewed. No pertinent surgical history.        09/05/24 1149   OT Last Visit   OT Visit Date 09/05/24  (Thursday)   Note Type   Note type Screen   Additional Comments OT orders received and chart review completed. Spoke w/ PTSue. Pt is completing ADLs at baseline level of I. Please re consult OT as appropriate if acute change in functional status. DC OT   Discharge Recommendation   Rehab Resource Intensity Level, OT No post-acute rehabilitation needs   Licensure   [place] License Number  Mari Carpenter OTR/L [id number]     Mari Carpenter OTR/L  [id number]  [id number]

## 2024-09-05 NOTE — ASSESSMENT & PLAN NOTE
Patient presented to ED with chest pain/abdominal pain, pointing to epigastric area. Seen in [place] ED 8/28 for chest pain with negative workup at that time. Patient reports symptoms persisted along with acid reflux as well.  CTA c/a/p: Partial/early small bowel obstruction with the mildly distended loops of the small bowel in the upper to mid abdomen with normal caliber ileal loops. Etiology is unclear.  Troponins negative, EKG showed NSR  Lipase negative  General surgery consulted  Gastrografin challenge 9/4 without evidence of bowel obstruction  NG tube removed, started on clear liquid diet  Advance as tolerated  Monitor abdominal exam  Continue protonix

## 2024-09-06 LAB
ANION GAP SERPL CALCULATED.3IONS-SCNC: 10 MMOL/L (ref 4–13)
BUN SERPL-MCNC: 10 MG/DL (ref 5–25)
CALCIUM SERPL-MCNC: 9.5 MG/DL (ref 8.4–10.2)
CHLORIDE SERPL-SCNC: 102 MMOL/L (ref 96–108)
CO2 SERPL-SCNC: 24 MMOL/L (ref 21–32)
CREAT SERPL-MCNC: 0.54 MG/DL (ref 0.6–1.3)
ERYTHROCYTE [DISTWIDTH] IN BLOOD BY AUTOMATED COUNT: 14.7 % (ref 11.6–15.1)
GFR SERPL CREATININE-BSD FRML MDRD: 125 ML/MIN/1.73SQ M
GLUCOSE SERPL-MCNC: 125 MG/DL (ref 65–140)
GLUCOSE SERPL-MCNC: 127 MG/DL (ref 65–140)
GLUCOSE SERPL-MCNC: 128 MG/DL (ref 65–140)
GLUCOSE SERPL-MCNC: 137 MG/DL (ref 65–140)
GLUCOSE SERPL-MCNC: 139 MG/DL (ref 65–140)
HCT VFR BLD AUTO: 41.5 % (ref 36.5–49.3)
HGB BLD-MCNC: 13.5 G/DL (ref 12–17)
MCH RBC QN AUTO: 26.2 PG (ref 26.8–34.3)
MCHC RBC AUTO-ENTMCNC: 32.5 G/DL (ref 31.4–37.4)
MCV RBC AUTO: 81 FL (ref 82–98)
PLATELET # BLD AUTO: 314 THOUSANDS/UL (ref 149–390)
PMV BLD AUTO: 9.4 FL (ref 8.9–12.7)
POTASSIUM SERPL-SCNC: 3.3 MMOL/L (ref 3.5–5.3)
RBC # BLD AUTO: 5.15 MILLION/UL (ref 3.88–5.62)
SODIUM SERPL-SCNC: 136 MMOL/L (ref 135–147)
WBC # BLD AUTO: 5.3 THOUSAND/UL (ref 4.31–10.16)

## 2024-09-06 PROCEDURE — 93005 ELECTROCARDIOGRAM TRACING: CPT

## 2024-09-06 PROCEDURE — 82948 REAGENT STRIP/BLOOD GLUCOSE: CPT

## 2024-09-06 PROCEDURE — 85027 COMPLETE CBC AUTOMATED: CPT

## 2024-09-06 PROCEDURE — 99232 SBSQ HOSP IP/OBS MODERATE 35: CPT | Performed by: NURSE PRACTITIONER

## 2024-09-06 PROCEDURE — 80048 BASIC METABOLIC PNL TOTAL CA: CPT

## 2024-09-06 RX ORDER — POTASSIUM CHLORIDE 1500 MG/1
40 TABLET, EXTENDED RELEASE ORAL ONCE
Status: COMPLETED | OUTPATIENT
Start: 2024-09-06 | End: 2024-09-06

## 2024-09-06 RX ADMIN — PANTOPRAZOLE SODIUM 40 MG: 40 INJECTION, POWDER, FOR SOLUTION INTRAVENOUS at 08:15

## 2024-09-06 RX ADMIN — PANTOPRAZOLE SODIUM 40 MG: 40 INJECTION, POWDER, FOR SOLUTION INTRAVENOUS at 21:27

## 2024-09-06 RX ADMIN — NICOTINE 1 PATCH: 7 PATCH, EXTENDED RELEASE TRANSDERMAL at 08:18

## 2024-09-06 RX ADMIN — POTASSIUM CHLORIDE 40 MEQ: 1500 TABLET, EXTENDED RELEASE ORAL at 08:14

## 2024-09-06 RX ADMIN — SODIUM CHLORIDE 75 ML/HR: 0.9 INJECTION, SOLUTION INTRAVENOUS at 11:02

## 2024-09-06 RX ADMIN — ENOXAPARIN SODIUM 40 MG: 40 INJECTION SUBCUTANEOUS at 08:14

## 2024-09-06 NOTE — ASSESSMENT & PLAN NOTE
Lab Results   Component Value Date    HGBA1C 6.7 (H) 09/03/2024       Recent Labs     09/05/24  1612 09/05/24 2058 09/06/24  0705 09/06/24  1058   POCGLU 124 124 139 125       Blood Sugar Average: Last 72 hrs:  (P) 145  Hold home metformin  SSI ac + hs  Patient had been tolerating diabetic clear liquids, transition to diabetic diet and monitor tolerance

## 2024-09-06 NOTE — PROGRESS NOTES
[facility]  Progress Note  Name: Galindo Patiño I  MRN: [id number]  Unit/Bed#: 2 [place] [address] I Date of Admission: 9/3/2024   Date of Service: 9/6/2024 I Hospital Day: 2    Assessment & Plan   * Abdominal pain  Assessment & Plan  Patient presented to ED with chest pain/abdominal pain, pointing to epigastric area. Seen in [place] ED 8/28 for chest pain with negative workup at that time. Patient reports symptoms persisted along with acid reflux as well.  CTA c/a/p: Partial/early small bowel obstruction with the mildly distended loops of the small bowel in the upper to mid abdomen with normal caliber ileal loops. Etiology is unclear.  Troponins negative, EKG showed NSR  Lipase negative  General surgery consulted  Gastrografin challenge 9/4 without evidence of bowel obstruction  NG tube removed, started on clear liquid diet  Tolerating clear liquid, advanced to diabetic diet, monitor tolerance  Monitor abdominal exam  Continue protonix   If tolerating diet and abdominal exam benign would consider discharge in a.m., will continue Protonix on discharge.      Elevated lactic acid level  Assessment & Plan  Lactic acid 2.5 > 2.2 s/p IVF  Possibly in setting of metformin use  Afebrile.  WBC 5.30  Repeat CBC in a.m.    GERD (gastroesophageal reflux disease)  Assessment & Plan  Continue Protonix    Type 2 diabetes mellitus (HCC)  Assessment & Plan  Lab Results   Component Value Date    HGBA1C 6.7 (H) 09/03/2024       Recent Labs     09/05/24  1612 09/05/24  2058 09/06/24  0705 09/06/24  1058   POCGLU 124 124 139 125       Blood Sugar Average: Last 72 hrs:  (P) 145  Hold home metformin  SSI ac + hs  Patient had been tolerating diabetic clear liquids, transition to diabetic diet and monitor tolerance               VTE Pharmacologic Prophylaxis: VTE Score: 2 Moderate Risk (Score 3-4) - Pharmacological DVT Prophylaxis Ordered: enoxaparin (Lovenox).    Mobility:   Basic Mobility Inpatient Raw Score: 24  JH-M  Goal: 8: Walk 250 feet or more  JH-HLM Achieved: 7: Walk 25 feet or more  JH-HLM Goal achieved. Continue to encourage appropriate mobility.    Patient Centered Rounds: I performed bedside rounds with nursing staff today.   Discussions with Specialists or Other Care Team Provider: multidisciplinary     Education and Discussions with Family / Patient:  Patient indicated he would call his sister .     Total Time Spent on Date of Encounter in care of patient: greater than 45 mins. This time was spent on one or more of the following: performing physical exam; counseling and coordination of care; obtaining or reviewing history; documenting in the medical record; reviewing/ordering tests, medications or procedures; communicating with other healthcare professionals and discussing with patient's family/caregivers.    Current Length of Stay: 2 day(s)  Current Patient Status: Inpatient   Certification Statement: The patient will continue to require additional inpatient hospital stay due to advance diet and monitor tolerance   Discharge Plan: Anticipate discharge in 24-48 hrs to home.    Code Status: Level 1 - Full Code    Subjective:   Patient is French-speaking only, interpretive services used through iPad.  Patient reported that he tolerated the clear liquid diet without any difficulty, is asking when he will be able to go home.  We discussed how we need to advance his diet and make sure he is tolerating it and not having return of the pain that he had prior to presentation.  He verbalized understanding.  States he slept okay.  Is hopeful he will be able to go home within the next day or so.    Objective:     Vitals:   Temp (24hrs), Av.2 °F (36.8 °C), Min:98 °F (36.7 °C), Max:98.4 °F (36.9 °C)    Temp:  [98 °F (36.7 °C)-98.4 °F (36.9 °C)] 98.1 °F (36.7 °C)  HR:  [78-84] 84  Resp:  [16-18] 18  BP: (116-120)/(81-84) 119/82  SpO2:  [92 %-94 %] 94 %  Body mass index is 32.88 kg/m².     Input and Output Summary (last 24  hours):     Intake/Output Summary (Last 24 hours) at 9/6/2024 1500  Last data filed at 9/6/2024 1000  Gross per 24 hour   Intake 510 ml   Output 280 ml   Net 230 ml       Physical Exam:   Physical Exam  Vitals and nursing note reviewed.   Constitutional:       Appearance: He is obese.   HENT:      Head: Normocephalic.      Nose: Nose normal.      Mouth/Throat:      Mouth: Mucous membranes are moist.   Eyes:      Extraocular Movements: Extraocular movements intact.      Conjunctiva/sclera: Conjunctivae normal.   Cardiovascular:      Rate and Rhythm: Normal rate and regular rhythm.      Pulses: Normal pulses.   Pulmonary:      Effort: Pulmonary effort is normal.      Breath sounds: Normal breath sounds.   Abdominal:      General: Bowel sounds are normal. There is no distension.      Palpations: Abdomen is soft.      Tenderness: There is no abdominal tenderness.   Genitourinary:     Comments: Voiding spontaneously   Musculoskeletal:         General: Normal range of motion.      Cervical back: Normal range of motion.      Right lower leg: No edema.      Left lower leg: No edema.   Skin:     General: Skin is warm and dry.      Capillary Refill: Capillary refill takes less than 2 seconds.   Neurological:      General: No focal deficit present.      Mental Status: He is alert.   Psychiatric:         Mood and Affect: Mood normal.         Behavior: Behavior normal.          Additional Data:     Labs:  Results from last 7 days   Lab Units 09/06/24 0448 09/05/24 0627 09/04/24  0814   WBC Thousand/uL 5.30   < > 6.02   HEMOGLOBIN g/dL 13.5   < > 13.9   HEMATOCRIT % 41.5   < > 43.8   PLATELETS Thousands/uL 314   < > 293   SEGS PCT %  --   --  68   LYMPHO PCT %  --   --  21   MONO PCT %  --   --  8   EOS PCT %  --   --  3    < > = values in this interval not displayed.     Results from last 7 days   Lab Units 09/06/24 0448 09/05/24 0627 09/04/24  0622   SODIUM mmol/L 136   < > 136   POTASSIUM mmol/L 3.3*   < > 4.2   CHLORIDE  mmol/L 102   < > 105   CO2 mmol/L 24   < > 23   BUN mg/dL 10   < > 9   CREATININE mg/dL 0.54*   < > 0.57*   ANION GAP mmol/L 10   < > 8   CALCIUM mg/dL 9.5   < > 8.6   ALBUMIN g/dL  --   --  3.5   TOTAL BILIRUBIN mg/dL  --   --  0.61   ALK PHOS U/L  --   --  112*   ALT U/L  --   --  49   AST U/L  --   --  23   GLUCOSE RANDOM mg/dL 127   < > 116    < > = values in this interval not displayed.     Results from last 7 days   Lab Units 09/03/24  1438   INR  0.95     Results from last 7 days   Lab Units 09/06/24  1058 09/06/24  0705 09/05/24  2058 09/05/24  1612 09/05/24  1048 09/05/24  0949 09/05/24  0629 09/04/24  1739 09/04/24  1258 09/04/24  0636 09/04/24  0101 09/03/24  2242   POC GLUCOSE mg/dl 125 139 124 124 182* 241* 147* 123 131 128 125 147*     Results from last 7 days   Lab Units 09/03/24  1438   HEMOGLOBIN A1C % 6.7*     Results from last 7 days   Lab Units 09/03/24  1720 09/03/24  1438   LACTIC ACID mmol/L 2.2* 2.5*   PROCALCITONIN ng/ml  --  0.07       Lines/Drains:  Invasive Devices       Peripheral Intravenous Line  Duration             Peripheral IV 09/03/24 Antecubital 3 days                          Imaging: Reviewed radiology reports from this admission including: xray(s) abdomen    Recent Cultures (last 7 days):   Results from last 7 days   Lab Units 09/03/24  1515   BLOOD CULTURE  No Growth at 48 hrs.       Last 24 Hours Medication List:   Current Facility-Administered Medications   Medication Dose Route Frequency Provider Last Rate    diphenhydrAMINE  50 mg Intravenous Q6H PRN Nelson Slade PA-C      enoxaparin  40 mg Subcutaneous Q24H [facility] Trino Spangler MD      insulin lispro  1-5 Units Subcutaneous HS Tamia Orozco PA-C      insulin lispro  1-6 Units Subcutaneous TID AC Tamia Orozco PA-C      morphine injection  2 mg Intravenous Q4H PRN Ericka Ambriz MD      nicotine  1 patch Transdermal Daily Ericka Ambriz MD      ondansetron  4 mg Intravenous Q8H PRN Ericka Ambriz MD      pantoprazole   40 mg Intravenous Q12H [facility] Ericka Ambriz MD      phenol  2 spray Mouth/Throat Q2H PRN Nelson Slade PA-C      sodium chloride  75 mL/hr Intravenous Continuous Ericka Ambriz MD 75 mL/hr (09/06/24 1102)        Today, Patient Was Seen By: MARIAH Morales    **Please Note: This note may have been constructed using a voice recognition system.**

## 2024-09-06 NOTE — ASSESSMENT & PLAN NOTE
Patient presented to ED with chest pain/abdominal pain, pointing to epigastric area. Seen in [place] ED 8/28 for chest pain with negative workup at that time. Patient reports symptoms persisted along with acid reflux as well.  CTA c/a/p: Partial/early small bowel obstruction with the mildly distended loops of the small bowel in the upper to mid abdomen with normal caliber ileal loops. Etiology is unclear.  Troponins negative, EKG showed NSR  Lipase negative  General surgery consulted  Gastrografin challenge 9/4 without evidence of bowel obstruction  NG tube removed, started on clear liquid diet  Tolerating clear liquid, advanced to diabetic diet, monitor tolerance  Monitor abdominal exam  Continue protonix   If tolerating diet and abdominal exam benign would consider discharge in a.m., will continue Protonix on discharge.

## 2024-09-06 NOTE — ASSESSMENT & PLAN NOTE
Lactic acid 2.5 > 2.2 s/p IVF  Possibly in setting of metformin use  Afebrile.  WBC 5.30  Repeat CBC in a.m.

## 2024-09-07 VITALS
OXYGEN SATURATION: 98 % | HEIGHT: 66 IN | DIASTOLIC BLOOD PRESSURE: 75 MMHG | WEIGHT: 203.7 LBS | BODY MASS INDEX: 32.74 KG/M2 | TEMPERATURE: 98.1 F | HEART RATE: 77 BPM | SYSTOLIC BLOOD PRESSURE: 115 MMHG | RESPIRATION RATE: 18 BRPM

## 2024-09-07 LAB
ANION GAP SERPL CALCULATED.3IONS-SCNC: 8 MMOL/L (ref 4–13)
BUN SERPL-MCNC: 11 MG/DL (ref 5–25)
CALCIUM SERPL-MCNC: 9.2 MG/DL (ref 8.4–10.2)
CHLORIDE SERPL-SCNC: 105 MMOL/L (ref 96–108)
CO2 SERPL-SCNC: 23 MMOL/L (ref 21–32)
CREAT SERPL-MCNC: 0.52 MG/DL (ref 0.6–1.3)
ERYTHROCYTE [DISTWIDTH] IN BLOOD BY AUTOMATED COUNT: 14.8 % (ref 11.6–15.1)
GFR SERPL CREATININE-BSD FRML MDRD: 126 ML/MIN/1.73SQ M
GLUCOSE SERPL-MCNC: 125 MG/DL (ref 65–140)
GLUCOSE SERPL-MCNC: 141 MG/DL (ref 65–140)
GLUCOSE SERPL-MCNC: 205 MG/DL (ref 65–140)
HCT VFR BLD AUTO: 41.9 % (ref 36.5–49.3)
HGB BLD-MCNC: 13.6 G/DL (ref 12–17)
MCH RBC QN AUTO: 26.5 PG (ref 26.8–34.3)
MCHC RBC AUTO-ENTMCNC: 32.5 G/DL (ref 31.4–37.4)
MCV RBC AUTO: 82 FL (ref 82–98)
PLATELET # BLD AUTO: 298 THOUSANDS/UL (ref 149–390)
PMV BLD AUTO: 9.7 FL (ref 8.9–12.7)
POTASSIUM SERPL-SCNC: 4 MMOL/L (ref 3.5–5.3)
RBC # BLD AUTO: 5.14 MILLION/UL (ref 3.88–5.62)
SODIUM SERPL-SCNC: 136 MMOL/L (ref 135–147)
WBC # BLD AUTO: 6 THOUSAND/UL (ref 4.31–10.16)

## 2024-09-07 PROCEDURE — 82948 REAGENT STRIP/BLOOD GLUCOSE: CPT

## 2024-09-07 PROCEDURE — 93005 ELECTROCARDIOGRAM TRACING: CPT

## 2024-09-07 PROCEDURE — 99239 HOSP IP/OBS DSCHRG MGMT >30: CPT | Performed by: NURSE PRACTITIONER

## 2024-09-07 PROCEDURE — 80048 BASIC METABOLIC PNL TOTAL CA: CPT | Performed by: NURSE PRACTITIONER

## 2024-09-07 PROCEDURE — 85027 COMPLETE CBC AUTOMATED: CPT | Performed by: NURSE PRACTITIONER

## 2024-09-07 RX ORDER — PANTOPRAZOLE SODIUM 40 MG/1
40 TABLET, DELAYED RELEASE ORAL DAILY
Qty: 30 TABLET | Refills: 0 | Status: SHIPPED | OUTPATIENT
Start: 2024-09-07 | End: 2024-10-07

## 2024-09-07 RX ADMIN — NICOTINE 1 PATCH: 7 PATCH, EXTENDED RELEASE TRANSDERMAL at 08:30

## 2024-09-07 RX ADMIN — INSULIN LISPRO 2 UNITS: 100 INJECTION, SOLUTION INTRAVENOUS; SUBCUTANEOUS at 11:36

## 2024-09-07 RX ADMIN — ENOXAPARIN SODIUM 40 MG: 40 INJECTION SUBCUTANEOUS at 08:28

## 2024-09-07 RX ADMIN — PANTOPRAZOLE SODIUM 40 MG: 40 INJECTION, POWDER, FOR SOLUTION INTRAVENOUS at 08:28

## 2024-09-07 RX ADMIN — SODIUM CHLORIDE 75 ML/HR: 0.9 INJECTION, SOLUTION INTRAVENOUS at 04:46

## 2024-09-07 NOTE — ASSESSMENT & PLAN NOTE
Lab Results   Component Value Date    HGBA1C 6.7 (H) 09/03/2024       Recent Labs     09/06/24  1058 09/06/24  1557 09/06/24 2027 09/07/24  0715   POCGLU 125 137 128 141*       Blood Sugar Average: Last 72 hrs:  (P) 142.5  Resume home metformin  Follow-up outpatient PCP 1 to 2 weeks postdischarge

## 2024-09-07 NOTE — DISCHARGE SUMMARY
[facility]  Discharge- Galindo Patiño 1975, 49 y.o. male MRN: [id number]  Unit/Bed#: [address] [place] [address] Encounter: [id number]  Primary Care Provider: Emy Argueta MD   Date and time admitted to hospital: 9/3/2024  2:22 PM    * Abdominal pain  Assessment & Plan  Patient presented to ED with chest pain/abdominal pain, pointing to epigastric area. Seen in [place] ED 8/28 for chest pain with negative workup at that time. Patient reports symptoms persisted along with acid reflux as well.  CTA c/a/p: Partial/early small bowel obstruction with the mildly distended loops of the small bowel in the upper to mid abdomen with normal caliber ileal loops. Etiology is unclear.  Troponins negative, EKG showed NSR  Lipase negative  General surgery consulted  Gastrografin challenge 9/4 without evidence of bowel obstruction  NG tube removed, started on clear liquid diet  Tolerating clear liquid, advanced to diabetic diet, tolerating  No nausea or vomiting  Continue Protonix 40 mg p.o. daily on discharge.  Patient is to follow-up with his primary care physician 1 to 2 weeks postdischarge  Patient indicated that he has an appointment in October with his PCP    Elevated lactic acid level  Assessment & Plan  Lactic acid 2.5 > 2.2 s/p IVF  Possibly in setting of metformin use  Afebrile.  WBC within normal limits.  Follow-up outpatient PCP 1 to 2 weeks postdischarge    GERD (gastroesophageal reflux disease)  Assessment & Plan  Continue Protonix on discharge.  E scribed 30-day supply for patient.  Follow-up outpatient PCP    Type 2 diabetes mellitus (HCC)  Assessment & Plan  Lab Results   Component Value Date    HGBA1C 6.7 (H) 09/03/2024       Recent Labs     09/06/24  1058 09/06/24  1557 09/06/24 2027 09/07/24  0715   POCGLU 125 137 128 141*       Blood Sugar Average: Last 72 hrs:  (P) 142.5  Resume home metformin  Follow-up outpatient PCP 1 to 2 weeks postdischarge        Medical Problems       Resolved  Problems  Date Reviewed: 9/7/2024   None       Discharging Physician / Practitioner: MARIAH Morales  PCP: Emy Argueta MD  Admission Date:   Admission Orders (From admission, onward)       Ordered        09/04/24 1428  INPATIENT ADMISSION  Once            09/03/24 1723  Place in Observation  Once                          Discharge Date: 09/07/24    Consultations During Hospital Stay:  Surgery     Procedures Performed:   Gastrografin challenge with contrast throughout colon    Significant Findings / Test Results:   CTA chest abdomen pelvis performed on 9/3 showed no evidence of thoracic aortic dissection, no evidence of pulmonary embolism.  Partial/early small bowel obstruction with the mildly distended loops of the small bowel in the upper to mid abdomen with normal caliber ileal loops.  Etiology is unclear as per radiology report.  Chest x-ray performed 9/4/2024 showed NG tube in second portion of duodenum, low lung volumes with vascular crowding and atelectasis, probable superimposed pulmonary edema as per radiology report.  X-ray abdomen complete performed on 9/5/2024 showed no evidence of bowel obstruction as per radiology report.    Incidental Findings:   Incidentally noted on CTA of chest abdomen pelvis is an enlarged lower right paratracheal lymph node measuring 2 cm.  This can be assessed with PET scan performed on a nonemergent basis.  Nonspecific 3 mm left upper lobe lung nodule.  Based on current Fleischner Society 2017 guidelines on incidental pulmonary nodule no routine follow-up is needed if the patient is low risk, if the patient is high risk optional follow-up CT chest at 12 months can be considered as per radiology report.  Patient indicated that he had been a smoker in the past  I reviewed the above mentioned incidental findings with the patient and/or family and they expressed understanding.    Test Results Pending at Discharge (will require follow up):   None     Outpatient  Tests Requested:  None    Complications: None    Reason for Admission: Abdominal pain    Hospital Course:   Galindo Patiño is a 49 y.o. male patient past medical history of type 2 diabetes, medication noncompliance who originally presented to the hospital on 9/3/2024 due to abdominal pain for 1 day.  The pain is generalized, moderate to severe, sharp in character associated with nausea.  No diarrhea constipation.  He reports chest discomfort substernal lower chest around the epigastric area associated with reflux-like symptoms.  No shortness of breath.  Patient had a CTA of chest abdomen pelvis performed demonstrating partial/early small bowel obstruction with mildly distended loops of the small bowel in the upper to mid abdomen with normal caliber ileal loops as per radiology report.  Surgery was consulted, NG tube was passed.  Repeat imaging x-ray of abdomen showed no evidence of bowel obstruction.  NG tube was removed and patient was started on clear liquid diet.  He was advanced to diabetic diet which she is tolerating without difficulty.  During hospitalization he was on Protonix IV twice daily, will continue with Protonix 40 mg daily.  He is to follow-up with his primary care physician 1 to 2 weeks postdischarge.  Incidental findings were discussed with the patient with interpretive services, he verbalized understanding for follow-up with his PCP and possible further imaging.  Patient is discharged to home today.            Please see above list of diagnoses and related plan for additional information.     Condition at Discharge: good    Discharge Day Visit / Exam:     Subjective:    Patient seen initially sleeping, awoken when spoken to.  Interpretive services were utilized for interviewing patient.  Reports he slept okay last night, tolerating diet no nausea or vomiting.  He is hopeful that he will be going home today.  We did discuss the incidental finding seen on his CTA of chest abdomen pelvis, and the  "need to follow-up with his outpatient PCP and possible further imaging.    Vitals: Blood Pressure: 115/75 (09/07/24 0746)  Pulse: 77 (09/07/24 0746)  Temperature: 98.1 °F (36.7 °C) (09/07/24 0746)  Temp Source: Oral (09/07/24 0746)  Respirations: 18 (09/07/24 0746)  Height: 5' 6\" (167.6 cm) (09/03/24 2000)  Weight - Scale: 92.4 kg (203 lb 11.2 oz) (09/03/24 2000)  SpO2: 98 % (09/07/24 0746)    Exam:   Physical Exam  Vitals and nursing note reviewed.   Constitutional:       General: He is not in acute distress.     Appearance: Normal appearance.   HENT:      Head: Normocephalic.      Nose: Nose normal.      Mouth/Throat:      Mouth: Mucous membranes are moist.   Eyes:      Extraocular Movements: Extraocular movements intact.      Conjunctiva/sclera: Conjunctivae normal.      Pupils: Pupils are equal, round, and reactive to light.   Cardiovascular:      Rate and Rhythm: Normal rate and regular rhythm.      Pulses: Normal pulses.      Heart sounds: Normal heart sounds.   Pulmonary:      Effort: Pulmonary effort is normal.      Breath sounds: Normal breath sounds.   Abdominal:      General: Bowel sounds are normal. There is no distension.      Palpations: Abdomen is soft.      Tenderness: There is no abdominal tenderness.   Genitourinary:     Comments: Voiding spontaneously   Musculoskeletal:         General: Normal range of motion.      Cervical back: Normal range of motion.   Skin:     General: Skin is warm and dry.      Capillary Refill: Capillary refill takes less than 2 seconds.   Neurological:      General: No focal deficit present.      Mental Status: He is alert and oriented to person, place, and time.   Psychiatric:         Mood and Affect: Mood normal.         Behavior: Behavior normal.          Discussion with Family: Patient declined call to .     Discharge instructions/Information to patient and family:   See after visit summary for information provided to patient and family.      Provisions " for Follow-Up Care:  See after visit summary for information related to follow-up care and any pertinent home health orders.      Mobility at time of Discharge:   Basic Mobility Inpatient Raw Score: 24  JH-HLM Goal: 8: Walk 250 feet or more  JH-HLM Achieved: 8: Walk 250 feet ot more  HLM Goal achieved. Continue to encourage appropriate mobility.     Disposition:   Home    Planned Readmission: No     Discharge Statement:  I spent greater than 45 minutes discharging the patient. This time was spent on the day of discharge. I had direct contact with the patient on the day of discharge. Greater than 50% of the total time was spent examining patient, answering all patient questions, arranging and discussing plan of care with patient as well as directly providing post-discharge instructions.  Additional time then spent on discharge activities.    Discharge Medications:  See after visit summary for reconciled discharge medications provided to patient and/or family.      **Please Note: This note may have been constructed using a voice recognition system**

## 2024-09-07 NOTE — ASSESSMENT & PLAN NOTE
Patient presented to ED with chest pain/abdominal pain, pointing to epigastric area. Seen in [place] ED 8/28 for chest pain with negative workup at that time. Patient reports symptoms persisted along with acid reflux as well.  CTA c/a/p: Partial/early small bowel obstruction with the mildly distended loops of the small bowel in the upper to mid abdomen with normal caliber ileal loops. Etiology is unclear.  Troponins negative, EKG showed NSR  Lipase negative  General surgery consulted  Gastrografin challenge 9/4 without evidence of bowel obstruction  NG tube removed, started on clear liquid diet  Tolerating clear liquid, advanced to diabetic diet, tolerating  No nausea or vomiting  Continue Protonix 40 mg p.o. daily on discharge.  Patient is to follow-up with his primary care physician 1 to 2 weeks postdischarge  Patient indicated that he has an appointment in October with his PCP

## 2024-09-07 NOTE — INCIDENTAL FINDINGS
The following findings require follow up:  Radiographic finding     Finding: Incidentally noted on CT scan is an enlarged lower right paratracheal lymph node measuring 2 cm, this can be assessed with PET scan performed on a nonemergent basis.  Nonspecific 3 mm left upper lobe lung nodule, current Fleischner Society 2017 guidelines on incidental pulmonary nodule no routine follow-up is needed if the patient is low risk.  If the patient is high risk optional follow-up chest CT at 12 months can be considered     Follow up required: Nonemergent PET scan, possible repeat CT scan in 12 months   Follow up should be done within 1 month(s)    Please notify the following clinician to assist with the follow up:   Dr. Emy Argueta, [facility]    Incidental finding results were discussed with the Patient by MARIAH Morales on 09/07/24.   They expressed understanding and all questions answered.    Interpretative services were utilized to ensure understanding

## 2024-09-07 NOTE — ASSESSMENT & PLAN NOTE
Lactic acid 2.5 > 2.2 s/p IVF  Possibly in setting of metformin use  Afebrile.  WBC within normal limits.  Follow-up outpatient PCP 1 to 2 weeks postdischarge

## 2024-09-07 NOTE — DISCHARGE INSTR - AVS FIRST PAGE
Please follow up with your family doctor, Dr. Argueta in the next one to two weeks post discharge    Please discuss with him the incidental finding of the paratracheal lymph node and lung nodule seen on your CAT scan during your hospitalization; radiology is recommending non emergent PET scan for paratracheal lymph node, and as you were a former smoker, should have a repeat chest CAT scan in 12 months for the lung nodule.     You are starting a new medication called Protonix which will help with your acid reflux; it has been e-scribed to your pharmacy. Take the medication early in the morning every day.

## 2024-09-08 LAB
ATRIAL RATE: 77 BPM
ATRIAL RATE: 78 BPM
ATRIAL RATE: 79 BPM
BACTERIA BLD CULT: NORMAL
P AXIS: 54 DEGREES
P AXIS: 54 DEGREES
P AXIS: 55 DEGREES
PR INTERVAL: 174 MS
PR INTERVAL: 180 MS
PR INTERVAL: 182 MS
QRS AXIS: 13 DEGREES
QRS AXIS: 23 DEGREES
QRS AXIS: 23 DEGREES
QRSD INTERVAL: 72 MS
QT INTERVAL: 378 MS
QT INTERVAL: 388 MS
QT INTERVAL: 394 MS
QTC INTERVAL: 433 MS
QTC INTERVAL: 442 MS
QTC INTERVAL: 445 MS
T WAVE AXIS: 14 DEGREES
T WAVE AXIS: 22 DEGREES
T WAVE AXIS: 24 DEGREES
VENTRICULAR RATE: 77 BPM
VENTRICULAR RATE: 78 BPM
VENTRICULAR RATE: 79 BPM

## 2024-09-08 PROCEDURE — 93010 ELECTROCARDIOGRAM REPORT: CPT | Performed by: INTERNAL MEDICINE

## 2024-09-09 LAB
ATRIAL RATE: 80 BPM
P AXIS: 56 DEGREES
PR INTERVAL: 170 MS
QRS AXIS: 20 DEGREES
QRSD INTERVAL: 70 MS
QT INTERVAL: 384 MS
QTC INTERVAL: 442 MS
T WAVE AXIS: 14 DEGREES
VENTRICULAR RATE: 80 BPM

## 2024-09-09 PROCEDURE — 93010 ELECTROCARDIOGRAM REPORT: CPT | Performed by: INTERNAL MEDICINE

## 2024-09-19 NOTE — UTILIZATION REVIEW
NOTIFICATION OF ADMISSION DISCHARGE   This is a Notification of Discharge from [facility]. Please be advised that this patient has been discharge from our facility. Below you will find the admission and discharge date and time including the patient’s disposition.   UTILIZATION REVIEW CONTACT:  Doreen Dean  Utilization   Network Utilization Review Department  Phone: [phone] x carefully listen to the prompts. All voicemails are confidential.  Email: [email or web address]     ADMISSION INFORMATION  PRESENTATION DATE: 9/3/2024  2:22 PM  OBERVATION ADMISSION DATE: 09/03/2024 1723  INPATIENT ADMISSION DATE: 9/4/24  2:28 PM   DISCHARGE DATE: 9/7/2024  1:11 PM   DISPOSITION:Home/Self Care    Network Utilization Review Department  ATTENTION: Please call with any questions or concerns to [phone] and carefully listen to the prompts so that you are directed to the right person. All voicemails are confidential.   For Discharge needs, contact Care Management DC Support Team at [phone] opt. 2  Send all requests for admission clinical reviews, approved or denied determinations and any other requests to dedicated fax number below belonging to the campus where the patient is receiving treatment. List of dedicated fax numbers for the Facilities:  FACILITY NAME UR FAX NUMBER   ADMISSION DENIALS (Administrative/Medical Necessity) [phone]   DISCHARGE SUPPORT TEAM ([facility]) [phone]   PARENT CHILD HEALTH (Maternity/NICU/Pediatrics) [phone]   [facility] [phone]   [facility] [phone]   [facility] [phone]   [facility] [phone]   [facility] [phone]   [facility] [phone]   [facility] [phone]   [facility]  [phone]   [facility] [phone]   Critical access hospital [phone]   [facility] [phone]   [facility] [phone]

## 2024-10-27 NOTE — UTILIZATION REVIEW
Continued Stay Review    Date: 09-06-24                          Current Patient Class: Inpatient  Current Level of Care: medical    HPI:49 y.o. male initially admitted on 09-03-24 for abdominal pain     Assessment/Plan:  Partial or early SBO NG tube removed starting clear liquid  and advance as tolerate, continue IV PPI, IVF  accu checks with SSI,  Patient reported that he tolerated the clear liquid diet without any difficulty, is asking when he will be able to go home. We discussed how we need to advance his diet and make sure he is tolerating it and not having return of the pain that he had prior to presentation  continue to monitor for abdominal pain, and supportive care.  Certification Statement: The patient will continue to require additional inpatient hospital stay due to advance diet and monitor tolerance     Vital Signs (last 3 days)       Date/Time Temp Pulse Resp BP MAP (mmHg) SpO2 Calculated FIO2 (%) - Nasal Cannula Nasal Cannula O2 Flow Rate (L/min) O2 Device Patient Position - Orthostatic VS Pain    09/06/24 15:12:34 98.4 °F (36.9 °C) 78 -- 117/80 92 95 % -- -- -- -- --    09/06/24 07:46:45 98.1 °F (36.7 °C) 84 18 119/82 94 94 % -- -- None (Room air) Lying 3    09/05/24 22:44:53 98 °F (36.7 °C) 80 16 116/83 94 93 % -- -- -- -- --    09/05/24 2001 -- -- -- -- -- -- -- -- -- -- No Pain    09/05/24 19:51:44 98.1 °F (36.7 °C) 78 18 119/84 96 92 % -- -- -- -- --    09/05/24 17:08:41 98.4 °F (36.9 °C) -- 18 120/81 94 -- -- -- -- -- --    09/05/24 0943 98.4 °F (36.9 °C) 93 18 122/78 95 92 % -- -- None (Room air) Sitting --    09/05/24 0800 -- -- -- -- -- -- -- -- -- -- No Pain    09/04/24 1954 98 °F (36.7 °C) 74 20 125/79 97 91 % -- -- None (Room air) Lying 2    09/04/24 0700 97.4 °F (36.3 °C) 77 18 110/73 -- 90 % -- -- None (Room air) Lying --    09/04/24 0420 97.5 °F (36.4 °C) 74 20 108/74 86 92 % -- -- None (Room air) Lying 5    09/03/24 2000 -- 74 -- -- -- -- -- -- -- -- --    09/03/24 1937 97.9 °F (36.6  °C) 94 22 126/80 98 96 % -- -- None (Room air) Lying 5    09/03/24 1900 -- 92 15 143/82 106 93 % -- -- None (Room air) Lying --    09/03/24 1800 -- 85 21 138/83 104 96 % -- -- -- Lying --    09/03/24 1730 -- 93 35 107/65 80 99 % -- -- None (Room air) Lying 4    09/03/24 1630 -- 82 21 104/71 84 99 % 28 2 L/min Nasal cannula Lying 4    09/03/24 1600 -- 83 23 97/60 75 93 % -- -- None (Room air) Lying --    09/03/24 1530 -- 82 21 94/66 76 93 % -- -- None (Room air) Lying 10 - Worst Possible Pain    09/03/24 1504 -- 70 20 115/74 -- 97 % -- -- None (Room air) -- --    09/03/24 1503 -- -- -- 100/60 -- -- -- -- -- -- --    09/03/24 1431 97 °F (36.1 °C) 81 20 88/70 -- 98 % -- -- None (Room air) -- --          Weight (last 2 days)       None              Pertinent Labs/Diagnostic Results:   Radiology:  XR abdomen complete inc upright and/or decubitus   Final Interpretation by Klever Lomax MD (09/05 0719)      No evidence of bowel obstruction.         Workstation performed: [id number]         XR chest portable   Final Interpretation by Maria Del Rosario Khan MD (09/04 1051)      NG tube in second portion of duodenum.      Low lung volumes with vascular crowding and atelectasis. Probable superimposed pulmonary edema.            Workstation performed: [id number]         CTA dissection protocol chest/abdomen/pelvis   Final Interpretation by Johnna Walden MD (09/03 1622)      No evidence of thoracic aortic dissection   No evidence of pulmonary embolism      Partial/early small bowel obstruction with the mildly distended loops of the small bowel in the upper to mid abdomen with normal caliber ileal loops.. Etiology is unclear      Incidentally noted is a enlarged lower right paratracheal lymph node measuring 2 cm. This can be assessed with the PET scan performed on nonemergent basis      Nonspecific 3 mm left upper lobe lung nodule, image 35 series 605. Based on current Fleischner Society 2017 Guidelines on incidental  pulmonary nodule, no routine follow-up is needed if the patient is low risk. If the patient is high risk, optional    follow-up chest CT at 12 months can be considered.         The study was marked in EPIC for immediate notification.         Workstation performed: [id number]           Cardiology:  ECG 12 lead   Final Result by Denys Betancourt MD (09/05 2307)   Normal sinus rhythm   Normal ECG   When compared with ECG of 04-SEP-2024 19:57, (unconfirmed)   No significant change was found   Confirmed by Denys Betancourt ([id number]) on 9/5/2024 11:06:58 PM      ECG 12 lead   Final Result by Denys Betancourt MD (09/05 2303)   Normal sinus rhythm   When compared with ECG of 03-SEP-2024 20:22, (unconfirmed)   No significant change was found   Confirmed by Denys Betancourt ([id number]) on 9/5/2024 11:03:49 PM      ECG 12 lead   Final Result by Denys Betancourt MD (09/05 2255)   Normal sinus rhythm   Normal ECG   When compared with ECG of 03-SEP-2024 14:28, (unconfirmed)   T wave inversion now evident in Inferior leads   Confirmed by Denys Betancourt ([id number]) on 9/5/2024 10:55:15 PM      ECG 12 lead   Final Result by Denys Betancourt MD (09/05 2216)   Normal sinus rhythm   Normal ECG   When compared with ECG of 28-AUG-2024 07:05,   Rate is slower      Confirmed by Denys Betancourt ([id number]) on 9/5/2024 10:16:36 PM        GI:  No orders to display           Results from last 7 days   Lab Units 09/06/24 0448 09/05/24 0627 09/04/24  0814 09/03/24  1438   WBC Thousand/uL 5.30 6.37 6.02 8.94   HEMOGLOBIN g/dL 13.5 13.9 13.9 15.2   HEMATOCRIT % 41.5 43.1 43.8 46.0   PLATELETS Thousands/uL 314 302 293 392*   TOTAL NEUT ABS Thousands/µL  --   --  4.03 6.33         Results from last 7 days   Lab Units 09/06/24 0448 09/05/24  0627 09/04/24  0622 09/03/24  1438   SODIUM mmol/L 136 135 136 134*   POTASSIUM mmol/L 3.3* 4.0 4.2 4.0   CHLORIDE mmol/L 102 100 105 99   CO2 mmol/L 24 26 23 24   ANION GAP mmol/L 10 9 8 11   BUN mg/dL 10 10 9 15   CREATININE mg/dL 0.54* 0.62 0.57*  0.80   EGFR ml/min/1.73sq m 125 118 122 106   CALCIUM mg/dL 9.5 9.4 8.6 10.0   MAGNESIUM mg/dL  --  1.9 2.1  --    PHOSPHORUS mg/dL  --   --  3.7  --      Results from last 7 days   Lab Units 09/04/24  0622 09/03/24  1438   AST U/L 23 24   ALT U/L 49 62*   ALK PHOS U/L 112* 124*   TOTAL PROTEIN g/dL 7.2 8.4   ALBUMIN g/dL 3.5 4.2   TOTAL BILIRUBIN mg/dL 0.61 0.53     Results from last 7 days   Lab Units 09/06/24  1058 09/06/24  0705 09/05/24  2058 09/05/24  1612 09/05/24  1048 09/05/24  0949 09/05/24  0629 09/04/24  1739 09/04/24  1258 09/04/24  0636 09/04/24  0101 09/03/24  2242   POC GLUCOSE mg/dl 125 139 124 124 182* 241* 147* 123 131 128 125 147*     Results from last 7 days   Lab Units 09/06/24  0448 09/05/24  0627 09/04/24  0622 09/03/24  1438   GLUCOSE RANDOM mg/dL 127 128 116 156*         Results from last 7 days   Lab Units 09/03/24  1438   HEMOGLOBIN A1C % 6.7*   EAG mg/dl 146         Results from last 7 days   Lab Units 09/04/24  0111 09/03/24  2301 09/03/24  2050 09/03/24  1438   HS TNI 0HR ng/L  --   --  <2 <2   HS TNI 2HR ng/L  --  <2  --   --    HS TNI 4HR ng/L <2  --   --   --          Results from last 7 days   Lab Units 09/03/24  1438   PROTIME seconds 13.1   INR  0.95   PTT seconds 30     Results from last 7 days   Lab Units 09/05/24  0627   TSH 3RD GENERATON uIU/mL 0.710     Results from last 7 days   Lab Units 09/03/24  1438   PROCALCITONIN ng/ml 0.07     Results from last 7 days   Lab Units 09/03/24  1720 09/03/24  1438   LACTIC ACID mmol/L 2.2* 2.5*     Results from last 7 days   Lab Units 09/03/24  1438   LIPASE u/L 21       Results from last 7 days   Lab Units 09/04/24  0218   AMPH/METH  Negative   BARBITURATE UR  Negative   BENZODIAZEPINE UR  Negative   COCAINE UR  Negative   METHADONE URINE  Negative   OPIATE UR  Negative   PCP UR  Negative   THC UR  Negative       Results from last 7 days   Lab Units 09/03/24  1515   BLOOD CULTURE  No Growth at 48 hrs.                   Medications:    Scheduled Medications:  enoxaparin, 40 mg, Subcutaneous, Q24H LIOR  insulin lispro, 1-5 Units, Subcutaneous, HS  insulin lispro, 1-6 Units, Subcutaneous, TID AC  nicotine, 1 patch, Transdermal, Daily  pantoprazole, 40 mg, Intravenous, Q12H LIOR      Continuous IV Infusions:  sodium chloride, 75 mL/hr, Intravenous, Continuous      PRN Meds:  diphenhydrAMINE, 50 mg, Intravenous, Q6H PRN  morphine injection, 2 mg, Intravenous, Q4H PRN  ondansetron, 4 mg, Intravenous, Q8H PRN  phenol, 2 spray, Mouth/Throat, Q2H PRN        Discharge Plan: TBD    Network Utilization Review Department  ATTENTION: Please call with any questions or concerns to [phone] and carefully listen to the prompts so that you are directed to the right person. All voicemails are confidential.   For Discharge needs, contact Care Management DC Support Team at [phone] opt. 2  Send all requests for admission clinical reviews, approved or denied determinations and any other requests to dedicated fax number below belonging to the [facility] where the patient is receiving treatment. List of dedicated fax numbers for the Facilities:  FACILITY NAME UR FAX NUMBER   ADMISSION DENIALS (Administrative/Medical Necessity) [phone]   DISCHARGE SUPPORT TEAM (NETWORK) [phone]   PARENT CHILD HEALTH (Maternity/NICU/Pediatrics) [phone]   [facility] [phone]   [facility] [phone]   [facility] [phone]   [facility] [phone]   [facility] [phone]   [facility] [phone]   [facility] [phone]   [facility] [phone]   [facility] [phone]   [facility] [phone]   [facility] [phone]   [facility]  [facility] [phone]        The patient is a 33y Female complaining of multiple medical complaints.